# Patient Record
Sex: MALE | Race: BLACK OR AFRICAN AMERICAN | NOT HISPANIC OR LATINO | Employment: UNEMPLOYED | URBAN - METROPOLITAN AREA
[De-identification: names, ages, dates, MRNs, and addresses within clinical notes are randomized per-mention and may not be internally consistent; named-entity substitution may affect disease eponyms.]

---

## 2019-10-22 ENCOUNTER — HOSPITAL ENCOUNTER (EMERGENCY)
Facility: HOSPITAL | Age: 24
Discharge: HOME | End: 2019-10-22
Attending: EMERGENCY MEDICINE | Admitting: EMERGENCY MEDICINE
Payer: COMMERCIAL

## 2019-10-22 ENCOUNTER — APPOINTMENT (EMERGENCY)
Dept: CT IMAGING | Facility: HOSPITAL | Age: 24
End: 2019-10-22
Payer: COMMERCIAL

## 2019-10-22 VITALS
HEART RATE: 62 BPM | DIASTOLIC BLOOD PRESSURE: 63 MMHG | BODY MASS INDEX: 20.81 KG/M2 | TEMPERATURE: 98.7 F | SYSTOLIC BLOOD PRESSURE: 101 MMHG | HEIGHT: 72 IN | OXYGEN SATURATION: 100 % | RESPIRATION RATE: 21 BRPM | WEIGHT: 153.66 LBS

## 2019-10-22 DIAGNOSIS — R20.2 PARESTHESIAS: Primary | ICD-10-CM

## 2019-10-22 LAB
ALBUMIN SERPL BCP-MCNC: 4 G/DL (ref 3.5–5)
ALP SERPL-CCNC: 124 U/L (ref 46–116)
ALT SERPL W P-5'-P-CCNC: 18 U/L (ref 12–78)
ANION GAP SERPL CALCULATED.3IONS-SCNC: 12 MMOL/L (ref 4–13)
AST SERPL W P-5'-P-CCNC: 20 U/L (ref 5–45)
BASOPHILS # BLD AUTO: 0.06 THOUSANDS/ΜL (ref 0–0.1)
BASOPHILS NFR BLD AUTO: 2 % (ref 0–1)
BILIRUB SERPL-MCNC: 0.2 MG/DL (ref 0.2–1)
BUN SERPL-MCNC: 13 MG/DL (ref 5–25)
CALCIUM SERPL-MCNC: 8.7 MG/DL (ref 8.3–10.1)
CHLORIDE SERPL-SCNC: 104 MMOL/L (ref 100–108)
CO2 SERPL-SCNC: 28 MMOL/L (ref 21–32)
CREAT SERPL-MCNC: 0.87 MG/DL (ref 0.6–1.3)
EOSINOPHIL # BLD AUTO: 0.36 THOUSAND/ΜL (ref 0–0.61)
EOSINOPHIL NFR BLD AUTO: 11 % (ref 0–6)
ERYTHROCYTE [DISTWIDTH] IN BLOOD BY AUTOMATED COUNT: 12.6 % (ref 11.6–15.1)
GFR SERPL CREATININE-BSD FRML MDRD: 140 ML/MIN/1.73SQ M
GLUCOSE SERPL-MCNC: 84 MG/DL (ref 65–140)
HCT VFR BLD AUTO: 40.9 % (ref 36.5–49.3)
HGB BLD-MCNC: 13.4 G/DL (ref 12–17)
IMM GRANULOCYTES # BLD AUTO: 0.02 THOUSAND/UL (ref 0–0.2)
IMM GRANULOCYTES NFR BLD AUTO: 1 % (ref 0–2)
LYMPHOCYTES # BLD AUTO: 1.39 THOUSANDS/ΜL (ref 0.6–4.47)
LYMPHOCYTES NFR BLD AUTO: 40 % (ref 14–44)
MAGNESIUM SERPL-MCNC: 1.8 MG/DL (ref 1.6–2.6)
MCH RBC QN AUTO: 30.7 PG (ref 26.8–34.3)
MCHC RBC AUTO-ENTMCNC: 32.8 G/DL (ref 31.4–37.4)
MCV RBC AUTO: 94 FL (ref 82–98)
MONOCYTES # BLD AUTO: 0.39 THOUSAND/ΜL (ref 0.17–1.22)
MONOCYTES NFR BLD AUTO: 12 % (ref 4–12)
NEUTROPHILS # BLD AUTO: 1.13 THOUSANDS/ΜL (ref 1.85–7.62)
NEUTS SEG NFR BLD AUTO: 34 % (ref 43–75)
NRBC BLD AUTO-RTO: 0 /100 WBCS
PHOSPHATE SERPL-MCNC: 3 MG/DL (ref 2.7–4.5)
PLATELET # BLD AUTO: 201 THOUSANDS/UL (ref 149–390)
PMV BLD AUTO: 9.7 FL (ref 8.9–12.7)
POTASSIUM SERPL-SCNC: 4.1 MMOL/L (ref 3.5–5.3)
PROT SERPL-MCNC: 7.4 G/DL (ref 6.4–8.2)
RBC # BLD AUTO: 4.36 MILLION/UL (ref 3.88–5.62)
SODIUM SERPL-SCNC: 144 MMOL/L (ref 136–145)
WBC # BLD AUTO: 3.35 THOUSAND/UL (ref 4.31–10.16)

## 2019-10-22 PROCEDURE — 36415 COLL VENOUS BLD VENIPUNCTURE: CPT | Performed by: EMERGENCY MEDICINE

## 2019-10-22 PROCEDURE — 85025 COMPLETE CBC W/AUTO DIFF WBC: CPT | Performed by: EMERGENCY MEDICINE

## 2019-10-22 PROCEDURE — 99284 EMERGENCY DEPT VISIT MOD MDM: CPT | Performed by: EMERGENCY MEDICINE

## 2019-10-22 PROCEDURE — 70496 CT ANGIOGRAPHY HEAD: CPT

## 2019-10-22 PROCEDURE — 93005 ELECTROCARDIOGRAM TRACING: CPT

## 2019-10-22 PROCEDURE — 84100 ASSAY OF PHOSPHORUS: CPT | Performed by: EMERGENCY MEDICINE

## 2019-10-22 PROCEDURE — 70498 CT ANGIOGRAPHY NECK: CPT

## 2019-10-22 PROCEDURE — 99284 EMERGENCY DEPT VISIT MOD MDM: CPT

## 2019-10-22 PROCEDURE — 83735 ASSAY OF MAGNESIUM: CPT | Performed by: EMERGENCY MEDICINE

## 2019-10-22 PROCEDURE — 80053 COMPREHEN METABOLIC PANEL: CPT | Performed by: EMERGENCY MEDICINE

## 2019-10-22 RX ADMIN — IOHEXOL 85 ML: 350 INJECTION, SOLUTION INTRAVENOUS at 16:46

## 2019-10-22 NOTE — ED PROVIDER NOTES
History  Chief Complaint   Patient presents with    Numbness     Pt c/o left arm numbness x 2 days  Pt states it has worsened today and is begining to radiate to his left leg and left side of face  Pt AOx4 at this time  70-year-old male presenting to the emergency department for evaluation of left arm numbness  Patient states that started about 2 days ago after smoking marijuana, felt anxious, temp as breathing and had some resolution of his symptoms with still persistent left arm numbness  States that since then it has spread to include the left side of his face as well as his left lower extremity  There is no weakness  There is no bowels gait or coordination deficit  There is no decrease in strength  He denies any headaches or neck pain  This been no nausea or vomiting  Patient is has skin tear in and states that he sometimes has difficulty having a complete diet  None       Past Medical History:   Diagnosis Date    Aneurysm ([facility] [address] ) 2010    R sided       History reviewed  No pertinent surgical history  History reviewed  No pertinent family history  I have reviewed and agree with the history as documented  Social History     Tobacco Use    Smoking status: Current Some Day Smoker     Types: Cigarettes    Smokeless tobacco: Never Used   Substance Use Topics    Alcohol use: Yes     Comment: Socially    Drug use: Not Currently        Review of Systems   Constitutional: Negative for appetite change, chills, fatigue and fever  HENT: Negative for sneezing and sore throat  Eyes: Negative for visual disturbance  Respiratory: Negative for cough, choking, chest tightness, shortness of breath and wheezing  Cardiovascular: Negative for chest pain and palpitations  Gastrointestinal: Negative for abdominal pain, constipation, diarrhea, nausea and vomiting  Genitourinary: Negative for difficulty urinating and dysuria  Neurological: Positive for numbness   Negative for dizziness, weakness, light-headedness and headaches  All other systems reviewed and are negative  Physical Exam  Physical Exam   Constitutional: He is oriented to person, place, and time  He appears well-developed and well-nourished  No distress  HENT:   Head: Normocephalic and atraumatic  Mouth/Throat: Oropharynx is clear and moist    Eyes: Pupils are equal, round, and reactive to light  EOM are normal    Neck: No JVD present  No tracheal deviation present  Cardiovascular: Normal rate, regular rhythm, normal heart sounds and intact distal pulses  Exam reveals no gallop and no friction rub  No murmur heard  Pulmonary/Chest: Effort normal and breath sounds normal  No respiratory distress  He has no wheezes  He has no rales  Abdominal: Soft  Bowel sounds are normal  He exhibits no distension  There is no tenderness  There is no rebound and no guarding  Neurological: He is alert and oriented to person, place, and time  No cranial nerve deficit  He exhibits normal muscle tone  Cranial nerves 2-12 are intact  Strength is 5/5 in all extremities  There is some subjective paresthesias in the left sided face upper and lower extremities  The sensation is otherwise grossly intact  Finger-to-nose is normal, heel-to-shin is normal    Skin: Skin is warm and dry  He is not diaphoretic  No pallor  Psychiatric: He has a normal mood and affect  His behavior is normal    Nursing note and vitals reviewed        Vital Signs  ED Triage Vitals [10/22/19 1453]   Temperature Pulse Respirations Blood Pressure SpO2   98 7 °F (37 1 °C) 84 16 105/63 100 %      Temp Source Heart Rate Source Patient Position - Orthostatic VS BP Location FiO2 (%)   Oral Monitor Sitting Left arm --      Pain Score       No Pain           Vitals:    10/22/19 1453 10/22/19 1615 10/22/19 1730   BP: 105/63 97/62 101/63   Pulse: 84 63 62   Patient Position - Orthostatic VS: Sitting Lying          Visual Acuity  Visual Acuity      Most Recent Value   L Pupil Size (mm)  3   R Pupil Size (mm)  3          ED Medications  Medications   iohexol (OMNIPAQUE) 350 MG/ML injection (MULTI-DOSE) 85 mL (85 mL Intravenous Given 10/22/19 1646)       Diagnostic Studies  Results Reviewed     Procedure Component Value Units Date/Time    Comprehensive metabolic panel [[id number]]  (Abnormal) Collected:  10/22/19 1606    Lab Status:  Final result Specimen:  Blood from Arm, Right Updated:  10/22/19 1627     Sodium 144 mmol/L      Potassium 4 1 mmol/L      Chloride 104 mmol/L      CO2 28 mmol/L      ANION GAP 12 mmol/L      BUN 13 mg/dL      Creatinine 0 87 mg/dL      Glucose 84 mg/dL      Calcium 8 7 mg/dL      AST 20 U/L      ALT 18 U/L      Alkaline Phosphatase 124 U/L      Total Protein 7 4 g/dL      Albumin 4 0 g/dL      Total Bilirubin 0 20 mg/dL      eGFR 140 ml/min/1 73sq m     Narrative:       [place] guidelines for Chronic Kidney Disease (CKD):     Stage 1 with normal or high GFR (GFR > 90 mL/min/1 73 square meters)    Stage 2 Mild CKD (GFR = 60-89 mL/min/1 73 square meters)    Stage 3A Moderate CKD (GFR = 45-59 mL/min/1 73 square meters)    Stage 3B Moderate CKD (GFR = 30-44 mL/min/1 73 square meters)    Stage 4 Severe CKD (GFR = 15-29 mL/min/1 73 square meters)    Stage 5 End Stage CKD (GFR <15 mL/min/1 73 square meters)  Note: GFR calculation is accurate only with a steady state creatinine    Magnesium [[id number]]  (Normal) Collected:  10/22/19 1606    Lab Status:  Final result Specimen:  Blood from Arm, Right Updated:  10/22/19 1627     Magnesium 1 8 mg/dL     Phosphorus [[id number]]  (Normal) Collected:  10/22/19 1606    Lab Status:  Final result Specimen:  Blood from Arm, Right Updated:  10/22/19 1627     Phosphorus 3 0 mg/dL     CBC and differential [[id number]]  (Abnormal) Collected:  10/22/19 1606    Lab Status:  Final result Specimen:  Blood from Arm, Right Updated:  10/22/19 1612     WBC 3 35 Thousand/uL      RBC 4 36 Million/uL Hemoglobin 13 4 g/dL      Hematocrit 40 9 %      MCV 94 fL      MCH 30 7 pg      MCHC 32 8 g/dL      RDW 12 6 %      MPV 9 7 fL      Platelets 952 Thousands/uL      nRBC 0 /100 WBCs      Neutrophils Relative 34 %      Immat GRANS % 1 %      Lymphocytes Relative 40 %      Monocytes Relative 12 %      Eosinophils Relative 11 %      Basophils Relative 2 %      Neutrophils Absolute 1 13 Thousands/µL      Immature Grans Absolute 0 02 Thousand/uL      Lymphocytes Absolute 1 39 Thousands/µL      Monocytes Absolute 0 39 Thousand/µL      Eosinophils Absolute 0 36 Thousand/µL      Basophils Absolute 0 06 Thousands/µL                  CTA head and neck with and without contrast   Final Result by Dawn Worley MD (10/22 1733)         1  No evidence of acute vascular territorial infarction, intracranial hemorrhage or mass effect  2   No stenosis, dissection or occlusion of the carotid or vertebral arteries or major vessels of the [place]  Workstation performed: [id number]                    Procedures  Procedures       ED Course                               MDM  Number of Diagnoses or Management Options  Diagnosis management comments: 27-year-old male with palpitations, paresthesias, check a UA EKG, labs, if workup unremarkable refer to neurology  Disposition  Final diagnoses:   Paresthesias     Time reflects when diagnosis was documented in both MDM as applicable and the Disposition within this note     Time User Action Codes Description Comment    10/22/2019  5:48 PM Maribel, [facility] [R20 2] Paresthesias       ED Disposition     ED Disposition Condition Date/Time Comment    Discharge Stable Tue Oct 22, 2019  5:48 PM Manny León discharge to home/self care              Follow-up Information     Follow up With Specialties Details Why Contact Info Additional [facility]   [address] [facility] [id number]  [address] [facility] [address] [facility], [address], [address]          Patient's Medications    No medications on file     No discharge procedures on file      ED Provider  Electronically Signed by           Reena Noland MD  10/22/19 7097

## 2019-10-23 LAB
ATRIAL RATE: 59 BPM
P AXIS: 83 DEGREES
PR INTERVAL: 142 MS
QRS AXIS: 85 DEGREES
QRSD INTERVAL: 88 MS
QT INTERVAL: 370 MS
QTC INTERVAL: 366 MS
T WAVE AXIS: 29 DEGREES
VENTRICULAR RATE: 59 BPM

## 2019-10-23 PROCEDURE — 93010 ELECTROCARDIOGRAM REPORT: CPT | Performed by: INTERNAL MEDICINE

## 2019-11-04 ENCOUNTER — OFFICE VISIT (OUTPATIENT)
Dept: FAMILY MEDICINE CLINIC | Facility: CLINIC | Age: 24
End: 2019-11-04
Payer: COMMERCIAL

## 2019-11-04 VITALS
SYSTOLIC BLOOD PRESSURE: 104 MMHG | WEIGHT: 150 LBS | HEIGHT: 73 IN | TEMPERATURE: 98.3 F | OXYGEN SATURATION: 98 % | HEART RATE: 74 BPM | BODY MASS INDEX: 19.88 KG/M2 | DIASTOLIC BLOOD PRESSURE: 62 MMHG

## 2019-11-04 DIAGNOSIS — R20.2 PARESTHESIAS: Primary | ICD-10-CM

## 2019-11-04 DIAGNOSIS — R07.89 CHEST PRESSURE: ICD-10-CM

## 2019-11-04 DIAGNOSIS — Z71.3 RESTRICTIONS OF THE DIET: ICD-10-CM

## 2019-11-04 PROCEDURE — 99243 OFF/OP CNSLTJ NEW/EST LOW 30: CPT | Performed by: NURSE PRACTITIONER

## 2019-11-04 NOTE — PROGRESS NOTES
Assessment/Plan:    Paresthesias  Provided referral to the neurologist     Chest pressure  To obtain labs, Holter monitor and echocardiogram   Provided referral to a cardiologist as requested  Restrictions of the diet  Patient primarily eats a vegetarian diet will occasionally eat fish  To obtain B12, will call with results  Will have patient sign medical release form to obtain prior records  Diagnoses and all orders for this visit:    Paresthesias  -     Ambulatory referral to Neurology; Future    Chest pressure  -     CBC and differential; Future  -     Comprehensive metabolic panel; Future  -     TSH, 3rd generation with Free T4 reflex; Future  -     Echo follow up/limited; Future  -     Holter monitor - 24 hour; Future  -     Ambulatory referral to Cardiology; Future    Restrictions of the diet  -     Vitamin B12; Future          Subjective:      Patient ID: Laura Christina is a 25 y o  male  Irena Calderon presents with his mother to establish care  He recently relocated here from [facility]  Irena Calderon was recently in [facility] on 10/22/2019 after developing paresthesias in his left upper extremity and left lower extremity after smoking marijuana  Lab work and a CTA of his head and neck were obtained  CTA was normal  He does have a history of an aneurysm that was noted on the right side of his neck at 13years of age  He had repeat testing a week later per mother in it had spontaneously disappeared  No interventions were needed  No medical records available to review  Denies past surgical history  Since this time, the numbness and tingling in his left upper an lower extremity has improved but is still present  He has also noted some weakness associated with this  Irena Calderon also notes some left anterior chest pressure  Denies personal history of cardiac issues, shortness of breath, dizziness, or palpitations  His mother is requesting a workup and referral to Cardiology    His mother was diagnosed with AFib at a young age  Declined influenza immunization  Markus primarily eats a vegetarian diet  He will eat fish on occasion  The following portions of the patient's history were reviewed and updated as appropriate:   He  has a past medical history of Aneurysm ([address] ) (2010)  He   Patient Active Problem List    Diagnosis Date Noted    Paresthesias 11/04/2019    Chest pressure 11/04/2019    Restrictions of the diet 11/04/2019     He  has no past surgical history on file  His family history includes Atrial fibrillation in his mother  He  reports that he has been smoking cigarettes  He has never used smokeless tobacco  He reports that he drinks alcohol  He reports that he has current or past drug history  Drug: Marijuana  No current outpatient medications on file  No current facility-administered medications for this visit  He has No Known Allergies       Review of Systems   Constitutional: Negative  HENT: Negative  Eyes: Positive for visual disturbance (on left side, this has since resolved)  Respiratory: Negative  Cardiovascular: Positive for chest pain  Negative for palpitations  Gastrointestinal: Negative  Endocrine: Negative  Genitourinary: Negative  Musculoskeletal: Negative  Skin: Negative  Allergic/Immunologic: Negative  Neurological: Positive for weakness (in LUE and LLE) and numbness  Hematological: Negative  Psychiatric/Behavioral: Negative  /62   Pulse 74   Temp 98 3 °F (36 8 °C)   Ht 6' 0 5" (1 842 m)   Wt 68 kg (150 lb)   SpO2 98%   BMI 20 06 kg/m²     Objective:     Physical Exam   Constitutional: He is oriented to person, place, and time  He appears well-developed and well-nourished  HENT:   Head: Normocephalic and atraumatic  Eyes: Pupils are equal, round, and reactive to light  Conjunctivae and EOM are normal    Neck: Normal range of motion  Neck supple  No thyromegaly present     Cardiovascular: Normal rate, regular rhythm and normal heart sounds  No murmur heard  Pulmonary/Chest: Effort normal and breath sounds normal  No respiratory distress  He has no wheezes  He has no rales  He exhibits no tenderness  Musculoskeletal: Normal range of motion  Lymphadenopathy:     He has no cervical adenopathy  Neurological: He is alert and oriented to person, place, and time  He has normal strength  No cranial nerve deficit or sensory deficit  He exhibits normal muscle tone  Coordination and gait normal    Skin: Skin is warm and dry  Capillary refill takes less than 2 seconds  Psychiatric: He has a normal mood and affect  His behavior is normal  Judgment and thought content normal    Nursing note and vitals reviewed

## 2019-11-04 NOTE — ASSESSMENT & PLAN NOTE
Patient primarily eats a vegetarian diet will occasionally eat fish  To obtain B12, will call with results

## 2019-11-04 NOTE — ASSESSMENT & PLAN NOTE
To obtain labs, Holter monitor and echocardiogram   Provided referral to a cardiologist as requested

## 2019-11-13 ENCOUNTER — APPOINTMENT (OUTPATIENT)
Dept: LAB | Facility: CLINIC | Age: 24
End: 2019-11-13
Payer: COMMERCIAL

## 2019-11-13 ENCOUNTER — HOSPITAL ENCOUNTER (OUTPATIENT)
Dept: NON INVASIVE DIAGNOSTICS | Facility: CLINIC | Age: 24
Discharge: HOME | End: 2019-11-13
Payer: COMMERCIAL

## 2019-11-13 DIAGNOSIS — R07.89 CHEST PRESSURE: ICD-10-CM

## 2019-11-13 DIAGNOSIS — Z71.3 RESTRICTIONS OF THE DIET: ICD-10-CM

## 2019-11-13 LAB
ALBUMIN SERPL BCP-MCNC: 4.5 G/DL (ref 3.5–5)
ALP SERPL-CCNC: 149 U/L (ref 46–116)
ALT SERPL W P-5'-P-CCNC: 29 U/L (ref 12–78)
ANION GAP SERPL CALCULATED.3IONS-SCNC: 7 MMOL/L (ref 4–13)
AST SERPL W P-5'-P-CCNC: 23 U/L (ref 5–45)
BASOPHILS # BLD AUTO: 0.07 THOUSANDS/ΜL (ref 0–0.1)
BASOPHILS NFR BLD AUTO: 2 % (ref 0–1)
BILIRUB SERPL-MCNC: 0.29 MG/DL (ref 0.2–1)
BUN SERPL-MCNC: 11 MG/DL (ref 5–25)
CALCIUM SERPL-MCNC: 9.2 MG/DL (ref 8.3–10.1)
CHLORIDE SERPL-SCNC: 107 MMOL/L (ref 100–108)
CO2 SERPL-SCNC: 28 MMOL/L (ref 21–32)
CREAT SERPL-MCNC: 0.81 MG/DL (ref 0.6–1.3)
EOSINOPHIL # BLD AUTO: 0.55 THOUSAND/ΜL (ref 0–0.61)
EOSINOPHIL NFR BLD AUTO: 13 % (ref 0–6)
ERYTHROCYTE [DISTWIDTH] IN BLOOD BY AUTOMATED COUNT: 13.2 % (ref 11.6–15.1)
GFR SERPL CREATININE-BSD FRML MDRD: 144 ML/MIN/1.73SQ M
GLUCOSE SERPL-MCNC: 63 MG/DL (ref 65–140)
HCT VFR BLD AUTO: 43.5 % (ref 36.5–49.3)
HGB BLD-MCNC: 13.5 G/DL (ref 12–17)
IMM GRANULOCYTES # BLD AUTO: 0.02 THOUSAND/UL (ref 0–0.2)
IMM GRANULOCYTES NFR BLD AUTO: 1 % (ref 0–2)
LYMPHOCYTES # BLD AUTO: 1.7 THOUSANDS/ΜL (ref 0.6–4.47)
LYMPHOCYTES NFR BLD AUTO: 40 % (ref 14–44)
MCH RBC QN AUTO: 30 PG (ref 26.8–34.3)
MCHC RBC AUTO-ENTMCNC: 31 G/DL (ref 31.4–37.4)
MCV RBC AUTO: 97 FL (ref 82–98)
MONOCYTES # BLD AUTO: 0.36 THOUSAND/ΜL (ref 0.17–1.22)
MONOCYTES NFR BLD AUTO: 9 % (ref 4–12)
NEUTROPHILS # BLD AUTO: 1.43 THOUSANDS/ΜL (ref 1.85–7.62)
NEUTS SEG NFR BLD AUTO: 35 % (ref 43–75)
NRBC BLD AUTO-RTO: 0 /100 WBCS
PLATELET # BLD AUTO: 217 THOUSANDS/UL (ref 149–390)
PMV BLD AUTO: 10.2 FL (ref 8.9–12.7)
POTASSIUM SERPL-SCNC: 4.3 MMOL/L (ref 3.5–5.3)
PROT SERPL-MCNC: 7.8 G/DL (ref 6.4–8.2)
RBC # BLD AUTO: 4.5 MILLION/UL (ref 3.88–5.62)
SODIUM SERPL-SCNC: 142 MMOL/L (ref 136–145)
TSH SERPL DL<=0.05 MIU/L-ACNC: 0.79 UIU/ML (ref 0.36–3.74)
VIT B12 SERPL-MCNC: 434 PG/ML (ref 100–900)
WBC # BLD AUTO: 4.13 THOUSAND/UL (ref 4.31–10.16)

## 2019-11-13 PROCEDURE — 85025 COMPLETE CBC W/AUTO DIFF WBC: CPT

## 2019-11-13 PROCEDURE — 82607 VITAMIN B-12: CPT

## 2019-11-13 PROCEDURE — 93226 XTRNL ECG REC<48 HR SCAN A/R: CPT

## 2019-11-13 PROCEDURE — 93308 TTE F-UP OR LMTD: CPT

## 2019-11-13 PROCEDURE — 80053 COMPREHEN METABOLIC PANEL: CPT

## 2019-11-13 PROCEDURE — 93308 TTE F-UP OR LMTD: CPT | Performed by: INTERNAL MEDICINE

## 2019-11-13 PROCEDURE — 93321 DOPPLER ECHO F-UP/LMTD STD: CPT | Performed by: INTERNAL MEDICINE

## 2019-11-13 PROCEDURE — 84443 ASSAY THYROID STIM HORMONE: CPT

## 2019-11-13 PROCEDURE — 93325 DOPPLER ECHO COLOR FLOW MAPG: CPT | Performed by: INTERNAL MEDICINE

## 2019-11-13 PROCEDURE — 93225 XTRNL ECG REC<48 HRS REC: CPT

## 2019-11-13 PROCEDURE — 36415 COLL VENOUS BLD VENIPUNCTURE: CPT

## 2019-11-14 ENCOUNTER — TELEPHONE (OUTPATIENT)
Dept: FAMILY MEDICINE CLINIC | Facility: CLINIC | Age: 24
End: 2019-11-14

## 2019-11-14 NOTE — TELEPHONE ENCOUNTER
----- Message from [id number] sent at 11/14/2019 12:34 PM EST -----  Please call patient to make aware that his echo was normal, thank you

## 2019-11-18 DIAGNOSIS — D72.819 LEUKOPENIA, UNSPECIFIED TYPE: Primary | ICD-10-CM

## 2019-11-18 DIAGNOSIS — R74.8 ELEVATED ALKALINE PHOSPHATASE LEVEL: ICD-10-CM

## 2019-11-18 NOTE — TELEPHONE ENCOUNTER
Please refer to the echo result note and "Notes recorded by MICHAELLE Katz on 11/18/2019 at 1:01 PM EST  Please call patient to make aware that lab work was ok other than his WBCs remain on the low side and his alk phos was elevated  I would like him to repeat both in 1 month to continue to monitor  Thank you"

## 2019-11-18 NOTE — TELEPHONE ENCOUNTER
Called emergency number on file and spoke with patient's mother and asked to have patient return call to the office, I did not see an updated communication form

## 2019-12-06 PROCEDURE — 93227 XTRNL ECG REC<48 HR R&I: CPT | Performed by: INTERNAL MEDICINE

## 2019-12-12 ENCOUNTER — TELEPHONE (OUTPATIENT)
Dept: FAMILY MEDICINE CLINIC | Facility: CLINIC | Age: 24
End: 2019-12-12

## 2019-12-12 NOTE — TELEPHONE ENCOUNTER
No answer voice mail is not set up    ----- Message from Josselin Mckenna, [address] sent at 12/12/2019  7:54 AM EST -----  Please call patient to make aware that Holter monitor showed primarily sinus rhythm with minimal ectopic or extra beats

## 2020-01-06 ENCOUNTER — TELEPHONE (OUTPATIENT)
Dept: CARDIOLOGY CLINIC | Facility: CLINIC | Age: 25
End: 2020-01-06

## 2020-01-06 NOTE — TELEPHONE ENCOUNTER
We received a Records Request for documents from 11/13/19    Faxed echo & holter results to [phone]

## 2020-02-25 ENCOUNTER — CONSULT (OUTPATIENT)
Dept: NEUROLOGY | Facility: CLINIC | Age: 25
End: 2020-02-25
Payer: COMMERCIAL

## 2020-02-25 VITALS
DIASTOLIC BLOOD PRESSURE: 70 MMHG | BODY MASS INDEX: 21.67 KG/M2 | HEART RATE: 68 BPM | WEIGHT: 160 LBS | SYSTOLIC BLOOD PRESSURE: 130 MMHG | HEIGHT: 72 IN

## 2020-02-25 DIAGNOSIS — R20.2 PARESTHESIAS: ICD-10-CM

## 2020-02-25 PROCEDURE — 99244 OFF/OP CNSLTJ NEW/EST MOD 40: CPT | Performed by: PSYCHIATRY & NEUROLOGY

## 2020-02-25 NOTE — PROGRESS NOTES
Laura Christina is a 25 y o  male  Chief Complaint   Patient presents with    Neurologic Problem       Assessment:  1  Paresthesias          Discussion:  Differential diagnosis discussed with the patient, will need to rule a demyelinating disorder, would recommend an MRI scan of the brain and routine blood work to evaluate for her symptoms, patient to call me after the above test to discuss the results, he was advised to follow up with family physician regarding his low WBC count and neutrophil comment and also to follow-up with the cardiologist as advised by the family physician, to keep his blood pressure cholesterol and sugar under control, avoid marijuana and other recreational drug use, to go to the hospital if has any worsening symptoms and call me otherwise to see me back in 2 months and follow up with his other physicians  Subjective:    HPI   Patient is here for evaluation of paresthesia of his face left arm and left leg, this happened in November, he had smoked marijuana a couple of days before when he had numbness of the left arm and the face and in the leg up to the thigh, initially felt anxious and then has had the left arm numbness which included to his left side of the face as well as part of the lower extremity, his symptoms in the lower leg lasted for about a day, the arm persisted for about 2 weeks, and the face was for about a day, he did not have any headache, there was no vision or speech difficulty, he had a CTA of the head and neck that was unremarkable, since then he did not have any recurrence of the symptoms, no neck pain, no vision difficulty, no speech difficulty, no history of seizure, no history of neurological conditions in the family at a young age, he does not do any other recreational drugs, he works as the artist, appetite is good, weight has been good, no recent illness, no other complaints      Vitals:    02/25/20 1108   BP: 130/70   BP Location: Left arm   Patient Position: Sitting   Cuff Size: Adult   Pulse: 68   Weight: 72 6 kg (160 lb)   Height: 6' (1 829 m)       Current Medications  No current outpatient medications on file  Allergies  Patient has no known allergies  Past Medical History  Past Medical History:   Diagnosis Date    Aneurysm ([address] ) 2010    R sided         Past Surgical History:  History reviewed  No pertinent surgical history  Family History:  Family History   Problem Relation Age of Onset    Atrial fibrillation Mother        Social History:   reports that he has been smoking cigarettes  He has never used smokeless tobacco  He reports that he drinks alcohol  He reports that he has current or past drug history  Drug: Marijuana  I have reviewed the past medical history, surgical history, social and family history, current medications, allergies vitals, review of systems, and updated this information as appropriate today  Objective:    Physical Exam    Neurological Exam      GENERAL:  Cooperative in no acute distress  Well-developed and well-nourished    HEAD and NECK   Head is atraumatic normocephalic with no lesions or masses  Neck is supple with full range of motion    CARDIOVASCULAR  Carotid Arteries-no carotid bruits  NEUROLOGIC:  Mental Status-the patient is awake alert and oriented without aphasia or apraxia  Cranial Nerves: Visual fields are full to confrontation  Discs are flat  Extraocular movements are full without nystagmus  Pupils are 2-1/2 mm and reactive  Face is symmetrical to light touch  Movements of facial expression move symmetrically  Hearing is normal to finger rub bilaterally  Soft palate lifts symmetrically  Shoulder shrug is symmetrical  Tongue is midline without atrophy  Motor: No drift is noted on arm extension  Strength is full in the upper and lower extremities with normal bulk and tone  Sensory: Intact to temperature and vibratory sensation in the upper and lower extremities bilaterally   Cortical function is intact  Coordination: Finger to nose testing is performed accurately  Romberg is negative  Gait reveals a normal base with symmetrical arm swing  Tandem walk is normal   Reflexes:    2+ and symmetrical   Toes are downgoing  No cervical spine tenderness, no meningeal signs, no temporal artery tenderness  ROS:  Review of Systems   Constitutional: Negative  Negative for appetite change and fever  HENT: Negative  Negative for hearing loss, tinnitus, trouble swallowing and voice change  Eyes: Negative  Negative for photophobia and pain  Respiratory: Negative  Negative for shortness of breath  Cardiovascular: Negative  Negative for palpitations  Gastrointestinal: Negative  Negative for nausea and vomiting  Endocrine: Negative  Negative for cold intolerance and heat intolerance  Genitourinary: Negative  Negative for dysuria, frequency and urgency  Musculoskeletal: Negative  Negative for myalgias and neck pain  Skin: Negative  Negative for rash  Neurological: Positive for numbness  Negative for dizziness, tremors, seizures, syncope, facial asymmetry, speech difficulty, weakness, light-headedness and headaches  Patient stated that he has had numbness on left side that last for about two weeks then went away  Hematological: Negative  Does not bruise/bleed easily  Psychiatric/Behavioral: Negative  Negative for confusion, hallucinations and sleep disturbance

## 2020-03-24 ENCOUNTER — TELEPHONE (OUTPATIENT)
Dept: NEUROLOGY | Facility: CLINIC | Age: 25
End: 2020-03-24

## 2020-03-24 NOTE — TELEPHONE ENCOUNTER
Received medical records request from [facility] for patient's entire medical record  Request scanned in to chart and faxed to [facility]      Fax Request to:  [facility]   Fax# [phone]    Sent to [facility] on 03/24/2020

## 2020-04-07 ENCOUNTER — TELEPHONE (OUTPATIENT)
Dept: NEUROLOGY | Facility: CLINIC | Age: 25
End: 2020-04-07

## 2020-06-25 ENCOUNTER — TELEPHONE (OUTPATIENT)
Dept: NEUROLOGY | Facility: CLINIC | Age: 25
End: 2020-06-25

## 2020-07-06 ENCOUNTER — APPOINTMENT (OUTPATIENT)
Dept: RADIOLOGY | Facility: CLINIC | Age: 25
End: 2020-07-06
Payer: COMMERCIAL

## 2020-07-06 VITALS
TEMPERATURE: 97.6 F | DIASTOLIC BLOOD PRESSURE: 70 MMHG | WEIGHT: 167 LBS | SYSTOLIC BLOOD PRESSURE: 108 MMHG | HEART RATE: 68 BPM | BODY MASS INDEX: 22.62 KG/M2 | HEIGHT: 72 IN

## 2020-07-06 DIAGNOSIS — S99.922A INJURY OF LEFT GREAT TOE, INITIAL ENCOUNTER: ICD-10-CM

## 2020-07-06 DIAGNOSIS — S90.112A CONTUSION OF LEFT GREAT TOE WITHOUT DAMAGE TO NAIL, INITIAL ENCOUNTER: Primary | ICD-10-CM

## 2020-07-06 PROCEDURE — 1036F TOBACCO NON-USER: CPT | Performed by: FAMILY MEDICINE

## 2020-07-06 PROCEDURE — 99203 OFFICE O/P NEW LOW 30 MIN: CPT | Performed by: FAMILY MEDICINE

## 2020-07-06 PROCEDURE — 3008F BODY MASS INDEX DOCD: CPT | Performed by: FAMILY MEDICINE

## 2020-07-06 PROCEDURE — 73660 X-RAY EXAM OF TOE(S): CPT

## 2020-07-06 NOTE — PROGRESS NOTES
Assessment/Plan:  Assessment/Plan   Diagnoses and all orders for this visit:    Contusion of left great toe without damage to nail, initial encounter  -     XR toe left great min 2 views; Future        40-year-old male with left great toe pain of onset from injury at home on 06/13/2020  Discussed with patient physical exam, radiographs, impression and plan  X-rays the left great toe are unremarkable for acute osseous abnormality  Physical exam noted for mild tenderness upon palpation at the medial aspect of the IP joint of the great toe, and along the base of the proximal phalanx and 1st metatarsal   Ankle is unremarkable for bony or soft tissue abnormality and has intact range of motion and strength  He has intact flexion and extension mechanism of the great toe however weakness with flexion  Clinical impression that he may have said contusion to the toe  He is to continue with conservative management  He is advised to take 1 tab of Aleve twice daily with food consistently for 10 days, start taking tumeric 500 mg twice daily, and continue with icing as needed  He is also advised to wear stiff-soled shoes  He was advised it may take another 3-4 to before symptoms resolve  He will follow up as needed  Subjective:   Patient ID: Lake Grewal is a 22 y o  male  Chief Complaint   Patient presents with    Left Great Toe - Pain       40-year-old active male presents for evaluation of left great toe pain of onset from injury at home on 06/13/2020  He was barefoot when his great toe struck the ground with axial load  He had pain that was described as sudden onset, generalized to the left great toe, throbbing, nonradiating, associated with swelling, and worse with bearing weight  He had difficulty bearing weight due to the pain  He did resting and icing which he states helped with pain and swelling    He start to bear weight with more ease, however pain started to radiate along the medial aspect of the foot           The following portions of the patient's history were reviewed and updated as appropriate: He  has a past medical history of Aneurysm ([facility] [address] ) (2010)  He  has no past surgical history on file  His family history includes Atrial fibrillation in his mother  He  reports that he has quit smoking  His smoking use included cigarettes  He started smoking about 4 months ago  He has never used smokeless tobacco  He reports that he drinks alcohol  He reports that he has current or past drug history  Drug: Marijuana  He has No Known Allergies       Review of Systems   Constitutional: Negative for chills and fever  HENT: Negative for sore throat  Eyes: Negative for visual disturbance  Respiratory: Negative for shortness of breath  Cardiovascular: Negative for chest pain  Gastrointestinal: Negative for abdominal pain  Genitourinary: Negative for flank pain  Musculoskeletal: Positive for arthralgias and joint swelling  Skin: Negative for rash and wound  Neurological: Negative for weakness and numbness  Hematological: Does not bruise/bleed easily  Psychiatric/Behavioral: Negative for self-injury  Objective:  Vitals:    07/06/20 0907   BP: 108/70   Pulse: 68   Temp: 97 6 °F (36 4 °C)   Weight: 75 8 kg (167 lb)   Height: 6' (1 829 m)     Left Ankle Exam     Muscle Strength   Dorsiflexion:  5/5   Plantar flexion:  5/5     Other   Sensation: normal    Comments:  Normal capillary refill      Left Knee Exam     Muscle Strength   The patient has normal left knee strength  Tenderness   The patient is experiencing no tenderness  Range of Motion   Extension: normal           Observations   Left Ankle/Foot   Negative for deformity  Tenderness   Left Ankle/Foot   Tenderness in the first metatarsal head   No tenderness in the Achilles insertion, anterior ankle, anterior talofibular ligament, fifth metatarsal base, calcaneofibular ligament, deltoid ligament, dorsum foot, lateral malleolus, medial malleolus, navicular, peroneal tendon, posterior talofibular ligament, proximal Achilles and talar dome  Additional Tenderness Details  Left great toe  -IP joint  -distal and proximal phalanges    Active Range of Motion   Left Ankle/Foot   Dorsiflexion (kf): WFL  Plantar flexion: WFL  Inversion: WFL  Eversion: WFL  Great toe flexion: 10 degrees   Great toe extension: [facility]    Strength/Myotome Testing     Left Ankle/Foot   Dorsiflexion: 5  Plantar flexion: 5  Inversion: 5  Eversion: 5  Great toe flexion: 4+  Great toe extension: 4+    Tests   Left Ankle/Foot   Negative for anterior drawer, metatarsal squeeze, posterior drawer, syndesmosis squeeze and syndesmosis external rotation  Physical Exam   Constitutional: He is oriented to person, place, and time  He appears well-developed  No distress  HENT:   Head: Normocephalic and atraumatic  Eyes: Conjunctivae are normal    Neck: No tracheal deviation present  Cardiovascular: Normal rate  Pulmonary/Chest: Effort normal  No respiratory distress  Abdominal: He exhibits no distension  Musculoskeletal:        Left ankle: No lateral malleolus, no medial malleolus, no CF ligament and no posterior TFL tenderness found  Left foot: There is no deformity  Neurological: He is alert and oriented to person, place, and time  Skin: Skin is warm and dry  Psychiatric: He has a normal mood and affect  His behavior is normal    Nursing note and vitals reviewed  I have personally reviewed pertinent films in PACS and my interpretation is No osseous abnormality of left great toe

## 2020-07-20 ENCOUNTER — TELEPHONE (OUTPATIENT)
Dept: NEUROLOGY | Facility: CLINIC | Age: 25
End: 2020-07-20

## 2020-07-20 NOTE — TELEPHONE ENCOUNTER
Attempted to call Clemente Butt at [phone] and [facility] letting patient know we are calling to see if he had rescheduled his MRI Brain and if he had his blood work done, We see he has an appointment today with Dr Jackson Gomez and we will see him later today

## 2020-07-23 ENCOUNTER — APPOINTMENT (OUTPATIENT)
Dept: RADIOLOGY | Facility: MEDICAL CENTER | Age: 25
End: 2020-07-23
Payer: COMMERCIAL

## 2020-07-23 ENCOUNTER — OFFICE VISIT (OUTPATIENT)
Dept: OBGYN CLINIC | Facility: MEDICAL CENTER | Age: 25
End: 2020-07-23
Payer: COMMERCIAL

## 2020-07-23 VITALS
BODY MASS INDEX: 22.35 KG/M2 | WEIGHT: 165 LBS | SYSTOLIC BLOOD PRESSURE: 114 MMHG | HEART RATE: 84 BPM | DIASTOLIC BLOOD PRESSURE: 77 MMHG | HEIGHT: 72 IN

## 2020-07-23 DIAGNOSIS — M79.674 PAIN OF TOE OF RIGHT FOOT: Primary | ICD-10-CM

## 2020-07-23 DIAGNOSIS — M79.674 PAIN OF TOE OF RIGHT FOOT: ICD-10-CM

## 2020-07-23 PROCEDURE — 3008F BODY MASS INDEX DOCD: CPT | Performed by: PHYSICAL MEDICINE & REHABILITATION

## 2020-07-23 PROCEDURE — 1036F TOBACCO NON-USER: CPT | Performed by: PHYSICAL MEDICINE & REHABILITATION

## 2020-07-23 PROCEDURE — 73630 X-RAY EXAM OF FOOT: CPT

## 2020-07-23 PROCEDURE — 99213 OFFICE O/P EST LOW 20 MIN: CPT | Performed by: PHYSICAL MEDICINE & REHABILITATION

## 2020-07-23 NOTE — PROGRESS NOTES
1  Pain of toe of right foot  XR foot 3+ vw right    diclofenac sodium (VOLTAREN) 1 %     Orders Placed This Encounter   Procedures    XR foot 3+ vw right        Imaging Studies (I personally reviewed images in PACS and report):  Right foot x-rays most recent to this encounter reviewed  These images show no acute osseous abnormalities  Impression:  Right 2nd toe pain likely secondary to bone contusion  We discussed different treatment options and decided to proceed with Voltaren gel and kelly taping  He will also continue to wear his stiff-soled shoes that he already has  I will see him back in about 3-4 weeks if he continues to have pain  Can consider repeat x-rays if that is still the case  Return in about 3 weeks (around 8/13/2020)  HPI:  Laverne Boeck is a 22 y o  male  who presents for evaluation of   Chief Complaint   Patient presents with    Right 2nd Toe - Pain       Onset/Mechanism: 7/17/2020- he tripped down the stairs with his toes getting caught in the steps  Location: Base of 2nd toe  Radiation: Denies  Quality: Aching and bruising  Provocative: Bending the toe  Severity: Hurts  Associated Symptoms: Bruising and tingling  Treatment so far: Kelly taped the toes together  He feels like it is improving  Review of Systems   Constitutional: Positive for activity change  Negative for fever  HENT: Negative for sore throat  Eyes: Negative for visual disturbance  Respiratory: Negative for shortness of breath  Cardiovascular: Negative for chest pain  Gastrointestinal: Negative for abdominal pain  Endocrine: Negative for polydipsia  Genitourinary: Negative for difficulty urinating  Musculoskeletal: Positive for arthralgias, gait problem and joint swelling  Skin: Negative for rash  Allergic/Immunologic: Negative for immunocompromised state  Neurological: Negative for numbness  Hematological: Does not bruise/bleed easily     Psychiatric/Behavioral: Negative for confusion  Following history reviewed and updated:  Past Medical History:   Diagnosis Date    Aneurysm ([address] ) 2010    R sided     History reviewed  No pertinent surgical history  Social History   Social History     Substance and Sexual Activity   Alcohol Use Yes    Comment: Socially     Social History     Substance and Sexual Activity   Drug Use Yes    Types: Marijuana     Social History     Tobacco Use   Smoking Status Former Smoker    Types: Cigarettes    Start date: 3/5/2020   Smokeless Tobacco Never Used     Family History   Problem Relation Age of Onset    Atrial fibrillation Mother      No Known Allergies     Constitutional:  /77   Pulse 84   Ht 6' (1 829 m)   Wt 74 8 kg (165 lb)   BMI 22 38 kg/m²    General: NAD  Eyes: Anicteric sclerae  Neck: Supple  Lungs: Unlabored breathing  Cardiovascular: No lower extremity edema  Skin: Intact without erythema  Neurologic: Sensation intact to light touch  Psychiatric: Mood and affect are appropriate  Right Ankle Exam     Tenderness   Right ankle tenderness location: Tenderness to palpation along the right 2nd toe at distal interphalangeal joint  Swelling: mild    Range of Motion   The patient has normal right ankle ROM  Muscle Strength   The patient has normal right ankle strength  Other   Erythema: absent  Scars: absent  Sensation: normal  Pulse: present              Procedures - none for this visit  Portions of the record may have been created with voice recognition software  Occasional wrong word or "sound a like" substitutions may have occurred due to the inherent limitations of voice recognition software  Read the chart carefully and recognize, using context, where substitutions have occurred

## 2020-08-04 ENCOUNTER — HOSPITAL ENCOUNTER (OUTPATIENT)
Dept: MRI IMAGING | Facility: CLINIC | Age: 25
Discharge: HOME | End: 2020-08-04
Attending: PSYCHIATRY & NEUROLOGY
Payer: COMMERCIAL

## 2020-08-04 DIAGNOSIS — R20.2 PARESTHESIAS: ICD-10-CM

## 2020-08-04 PROCEDURE — 70551 MRI BRAIN STEM W/O DYE: CPT

## 2020-08-05 DIAGNOSIS — I67.1 INTRACRANIAL ANEURYSM: Primary | ICD-10-CM

## 2020-08-07 ENCOUNTER — TELEPHONE (OUTPATIENT)
Dept: NEUROSURGERY | Facility: CLINIC | Age: 25
End: 2020-08-07

## 2020-08-10 ENCOUNTER — TELEPHONE (OUTPATIENT)
Dept: NEUROLOGY | Facility: CLINIC | Age: 25
End: 2020-08-10

## 2020-08-10 ENCOUNTER — CONSULT (OUTPATIENT)
Dept: NEUROSURGERY | Facility: CLINIC | Age: 25
End: 2020-08-10
Payer: COMMERCIAL

## 2020-08-10 VITALS
WEIGHT: 163 LBS | TEMPERATURE: 98.3 F | RESPIRATION RATE: 16 BRPM | BODY MASS INDEX: 22.08 KG/M2 | HEART RATE: 85 BPM | DIASTOLIC BLOOD PRESSURE: 70 MMHG | HEIGHT: 72 IN | SYSTOLIC BLOOD PRESSURE: 110 MMHG

## 2020-08-10 DIAGNOSIS — I67.1 INTRACRANIAL ANEURYSM: Primary | ICD-10-CM

## 2020-08-10 PROCEDURE — 99244 OFF/OP CNSLTJ NEW/EST MOD 40: CPT | Performed by: NEUROLOGICAL SURGERY

## 2020-08-10 PROCEDURE — 3008F BODY MASS INDEX DOCD: CPT | Performed by: NEUROLOGICAL SURGERY

## 2020-08-10 NOTE — PROGRESS NOTES
Patient Id: Gerardo Bhatia is a 22 y o  male        Handedness: Right      Assessment/Plan:    Diagnoses and all orders for this visit:    Intracranial aneurysm  -     Ambulatory referral to Neurosurgery  -     IR cerebral angiography; Future        Discussion Summary:   1  Fusiform aneurysmal dilatation of right M2 branch  We discussed the natural history and diagnosis of aneurysms, specifically regarding fusiform aneurysms  His MRI did not had any evidence of ischemic changes however his prior symptomatology could be associated with small  injury  I would like to perform diagnostic cerebral arteriogram to better evaluate relationship to lateral lenticulostriate and this vessel irregularity  In addition this will provide baseline for continued follow-up given his young age  We discussed the risks and benefits associated for the formal arteriogram including bleeding, vessel injury, and stroke  He will schedule this at his earliest convenience  Chief Complaint: Consult      HPI:   This is a very pleasant 66-year-old gentleman who in October had an episode of left-sided arm and leg numbness after smoking marijuana  This did not resolve for several days with the left leg and about 2 weeks in the left leg  As such he presented to Neurology  MRI of his brain and CTA were performed  MRI was negative for ischemic events or sequelae however both the MRI and CTA were notable for a enlarged and dilated right M2 branch concerning for fusiform aneurysm  As such he was referred for evaluation  At this time he denies any new numbness tingling or weakness  He denies any new focal neurologic deficit  He states that every once in a while he gets a headache or feels lightheaded these episodes are short lived  His past medical history is insignificant  He has never had surgery before  He is not allergic to any medications  He takes no medications  He is single  He is employed as a musician  He has no children  He uses marijuana intermittently  He quit smoking in 2020  He previously smoked about 1 pack a week  He denies any use of illicit drugs, specifically heroin, cocaine, methamphetamine  He has no family history of aneurysms that he is aware of he states that his grandmother and aunt  of strokes  Review of systems obtained by the MA reviewed and updated below  Review of Systems   Constitutional: Negative  HENT: Negative  Eyes: Negative  Respiratory: Negative  Cardiovascular: Negative  Gastrointestinal: Negative  Endocrine: Negative  Genitourinary: Negative  Musculoskeletal: Negative  Skin: Negative  Allergic/Immunologic: Negative  Neurological: Positive for light-headedness (when he works out feels like he gets light-headed quicker)  Negative for dizziness, tremors, seizures, speech difficulty, weakness, numbness and headaches  Hematological: Negative  Psychiatric/Behavioral: Negative  Physical Exam  Vitals:    08/10/20 1457   BP: 110/70   Pulse: 85   Resp: 16   Temp: 98 3 °F (36 8 °C)   He is well appearing  Affect is appropriate  His BMI is Body mass index is 22 11 kg/m²  Claudette Mcadniel He is awake alert and oriented  Hearing and vision are grossly intact  His pupils are equal round reactive to light  His extraocular movements are intact  His face is symmetric  Tongue is midline  Facial sensation is intact and symmetric throughout  Shoulder shrug is 5/5  There is no drift or dysmetria  He has full strength in his bilateral upper and lower extremities  He has normal muscle tone muscle bulk  His biceps reflexes and patellar reflexes are 2+ and symmetric  Ofelia sign negative bilaterally  Sensation intact to light touch and pinprick throughout  His gait is normal     His heart rate is regular  His breath sounds are clear  2+ radial pulses no carotid bruit       The following portions of the patient's history were reviewed and updated as appropriate: allergies, current medications, past family history, past medical history, past social history, past surgical history and problem list     Active Ambulatory Problems     Diagnosis Date Noted    Paresthesias 11/04/2019    Chest pressure 11/04/2019    Restrictions of the diet 11/04/2019    Pain of toe of right foot 07/23/2020    Intracranial aneurysm 08/10/2020     Resolved Ambulatory Problems     Diagnosis Date Noted    No Resolved Ambulatory Problems     Past Medical History:   Diagnosis Date    Aneurysm ([address] ) 2010       No past surgical history on file  Current Outpatient Medications:     diclofenac sodium (VOLTAREN) 1 %, Apply 2 g topically 3 (three) times a day as needed (Pain), Disp: 100 g, Rfl: 1    Results/Data: We reviewed his MRI and CT in detail  We also reviewed the report

## 2020-08-12 ENCOUNTER — TELEPHONE (OUTPATIENT)
Dept: OTHER | Facility: OTHER | Age: 25
End: 2020-08-12

## 2020-08-13 ENCOUNTER — TELEPHONE (OUTPATIENT)
Dept: OTHER | Facility: HOSPITAL | Age: 25
End: 2020-08-13

## 2020-08-13 NOTE — TELEPHONE ENCOUNTER
Patient phoned at approximately 23:40 complaining of left side headache and facial numbness  He was seen in consultation on 8/10/2020 for right M2 branch fusiform aneurysm  He is scheduled for a diagnostic cerebral arteriogram on 8/26/2020  He describes the headache as a "migraine" similar to his usual migraines  He describes that his headaches are usually on the right side but this time it was on the left and associated with facial numbness  He has been icing the back of his head and neck and states his headache has now resolved along with the facial numbness  He denies any nausea or vomiting  He denies any neck pain  He states the headache was mild in onset  We discussed signs and symptoms of aneurysmal rupture including sudden onset of high intensity headache  He denies that this is [facility]  We both agreed that his symptoms are unlikely a result of aneurysm rupture since they have mostly resolved and were not sudden or intense  We discussed taking OTC Tylenol for further pain relief  We discussed return precautions prompting presentation to an ER including those discussed above as well as vomiting or seizures  He agrees that he does not think he needs emergent care  We will phone him in the morning to further evaluate how he's feeling

## 2020-08-13 NOTE — TELEPHONE ENCOUNTER
Pt Oliver Alejandra : 1995 MSG: Pt callled because he was recently diagnosed by Dr Michael Frank as having an intracranial aneurysm  Pt has a very bad headache and feels "tingly" in the right side of his face which hasn't happened before and he's concerned  Episode started about 30 minutes ago   Pt can be contacted at [phone]

## 2020-08-29 ENCOUNTER — HOSPITAL ENCOUNTER (EMERGENCY)
Facility: HOSPITAL | Age: 25
Discharge: HOME | End: 2020-08-29
Attending: EMERGENCY MEDICINE | Admitting: EMERGENCY MEDICINE
Payer: COMMERCIAL

## 2020-08-29 ENCOUNTER — APPOINTMENT (EMERGENCY)
Dept: CT IMAGING | Facility: HOSPITAL | Age: 25
End: 2020-08-29
Payer: COMMERCIAL

## 2020-08-29 VITALS
WEIGHT: 160 LBS | HEART RATE: 68 BPM | RESPIRATION RATE: 17 BRPM | OXYGEN SATURATION: 100 % | HEIGHT: 72 IN | BODY MASS INDEX: 21.67 KG/M2 | DIASTOLIC BLOOD PRESSURE: 69 MMHG | TEMPERATURE: 98.2 F | SYSTOLIC BLOOD PRESSURE: 100 MMHG

## 2020-08-29 DIAGNOSIS — R51.9 ACUTE NONINTRACTABLE HEADACHE, UNSPECIFIED HEADACHE TYPE: Primary | ICD-10-CM

## 2020-08-29 LAB
ANION GAP SERPL CALCULATED.3IONS-SCNC: 8 MMOL/L (ref 4–13)
BASOPHILS # BLD AUTO: 0.07 THOUSANDS/ΜL (ref 0–0.1)
BASOPHILS NFR BLD AUTO: 1 % (ref 0–1)
BUN SERPL-MCNC: 9 MG/DL (ref 5–25)
CALCIUM SERPL-MCNC: 8.8 MG/DL (ref 8.3–10.1)
CHLORIDE SERPL-SCNC: 103 MMOL/L (ref 100–108)
CO2 SERPL-SCNC: 30 MMOL/L (ref 21–32)
CREAT SERPL-MCNC: 1.07 MG/DL (ref 0.6–1.3)
EOSINOPHIL # BLD AUTO: 0.09 THOUSAND/ΜL (ref 0–0.61)
EOSINOPHIL NFR BLD AUTO: 2 % (ref 0–6)
ERYTHROCYTE [DISTWIDTH] IN BLOOD BY AUTOMATED COUNT: 12.2 % (ref 11.6–15.1)
GFR SERPL CREATININE-BSD FRML MDRD: 111 ML/MIN/1.73SQ M
GLUCOSE SERPL-MCNC: 102 MG/DL (ref 65–140)
HCT VFR BLD AUTO: 40.3 % (ref 36.5–49.3)
HGB BLD-MCNC: 13.1 G/DL (ref 12–17)
IMM GRANULOCYTES # BLD AUTO: 0.03 THOUSAND/UL (ref 0–0.2)
IMM GRANULOCYTES NFR BLD AUTO: 1 % (ref 0–2)
LYMPHOCYTES # BLD AUTO: 1.74 THOUSANDS/ΜL (ref 0.6–4.47)
LYMPHOCYTES NFR BLD AUTO: 36 % (ref 14–44)
MCH RBC QN AUTO: 30.8 PG (ref 26.8–34.3)
MCHC RBC AUTO-ENTMCNC: 32.5 G/DL (ref 31.4–37.4)
MCV RBC AUTO: 95 FL (ref 82–98)
MONOCYTES # BLD AUTO: 0.51 THOUSAND/ΜL (ref 0.17–1.22)
MONOCYTES NFR BLD AUTO: 11 % (ref 4–12)
NEUTROPHILS # BLD AUTO: 2.4 THOUSANDS/ΜL (ref 1.85–7.62)
NEUTS SEG NFR BLD AUTO: 49 % (ref 43–75)
NRBC BLD AUTO-RTO: 0 /100 WBCS
PLATELET # BLD AUTO: 191 THOUSANDS/UL (ref 149–390)
PMV BLD AUTO: 10.3 FL (ref 8.9–12.7)
POTASSIUM SERPL-SCNC: 3.7 MMOL/L (ref 3.5–5.3)
RBC # BLD AUTO: 4.26 MILLION/UL (ref 3.88–5.62)
SODIUM SERPL-SCNC: 141 MMOL/L (ref 136–145)
WBC # BLD AUTO: 4.84 THOUSAND/UL (ref 4.31–10.16)

## 2020-08-29 PROCEDURE — 96375 TX/PRO/DX INJ NEW DRUG ADDON: CPT

## 2020-08-29 PROCEDURE — 99285 EMERGENCY DEPT VISIT HI MDM: CPT | Performed by: EMERGENCY MEDICINE

## 2020-08-29 PROCEDURE — NC001 PR NO CHARGE: Performed by: PHYSICIAN ASSISTANT

## 2020-08-29 PROCEDURE — 99284 EMERGENCY DEPT VISIT MOD MDM: CPT

## 2020-08-29 PROCEDURE — 85025 COMPLETE CBC W/AUTO DIFF WBC: CPT | Performed by: EMERGENCY MEDICINE

## 2020-08-29 PROCEDURE — 70496 CT ANGIOGRAPHY HEAD: CPT

## 2020-08-29 PROCEDURE — 70498 CT ANGIOGRAPHY NECK: CPT

## 2020-08-29 PROCEDURE — 80048 BASIC METABOLIC PNL TOTAL CA: CPT | Performed by: EMERGENCY MEDICINE

## 2020-08-29 PROCEDURE — 96374 THER/PROPH/DIAG INJ IV PUSH: CPT

## 2020-08-29 PROCEDURE — G1004 CDSM NDSC: HCPCS

## 2020-08-29 PROCEDURE — 36415 COLL VENOUS BLD VENIPUNCTURE: CPT | Performed by: EMERGENCY MEDICINE

## 2020-08-29 PROCEDURE — 96361 HYDRATE IV INFUSION ADD-ON: CPT

## 2020-08-29 RX ORDER — METOCLOPRAMIDE HYDROCHLORIDE 5 MG/ML
10 INJECTION INTRAMUSCULAR; INTRAVENOUS ONCE
Status: COMPLETED | OUTPATIENT
Start: 2020-08-29 | End: 2020-08-29

## 2020-08-29 RX ORDER — DIPHENHYDRAMINE HYDROCHLORIDE 50 MG/ML
25 INJECTION INTRAMUSCULAR; INTRAVENOUS ONCE
Status: COMPLETED | OUTPATIENT
Start: 2020-08-29 | End: 2020-08-29

## 2020-08-29 RX ORDER — ACETAMINOPHEN 325 MG/1
650 TABLET ORAL ONCE
Status: COMPLETED | OUTPATIENT
Start: 2020-08-29 | End: 2020-08-29

## 2020-08-29 RX ADMIN — DIPHENHYDRAMINE HYDROCHLORIDE 25 MG: 50 INJECTION, SOLUTION INTRAMUSCULAR; INTRAVENOUS at 18:55

## 2020-08-29 RX ADMIN — ACETAMINOPHEN 650 MG: 325 TABLET, FILM COATED ORAL at 18:54

## 2020-08-29 RX ADMIN — METOCLOPRAMIDE HYDROCHLORIDE 10 MG: 5 INJECTION INTRAMUSCULAR; INTRAVENOUS at 18:58

## 2020-08-29 RX ADMIN — SODIUM CHLORIDE 1000 ML: 0.9 INJECTION, SOLUTION INTRAVENOUS at 18:53

## 2020-08-29 RX ADMIN — IOHEXOL 85 ML: 350 INJECTION, SOLUTION INTRAVENOUS at 19:34

## 2020-08-29 NOTE — ED PROVIDER NOTES
History  Chief Complaint   Patient presents with    Headache     Patient co intermittent headache, stiff neck, facial numbness/tingling  Patient recently dx with intracranial anuerysm  Patient states "I just feel worried with my diagnosis and my symptoms "      Patient is a 26-year-old male past medical history of intracranial aneurysm diagnosed 08/10/2020 presenting for headaches and left facial numbness  Patient states the last 2 weeks he has had intermittent headaches which he states are left posterior, occasionally right posterior and describes a shooting pain which radiates down his neck  He also notes intermittent left-sided facial numbness and tingling and states he occasionally gets numbness tingling to the forehead, left arm, left leg  He also notes intermittently blurred vision to his right eye MO so when he is having headaches which are photophobic and photophobic in nature  He states that he was taking Tylenol p m  3 times this week as well as Aleve last night, 2 other times this week which he states does give some relief  Denies any head injuries  He states that he was told that he had an aneurysm but is unsure who is supposed to follow-up with was told that he was developing symptoms such as these he should be evaluated in the emergency department  Denies any fevers, nausea vomiting, upper respiratory symptoms, rashes, dysuria, chest pain, shortness of breath, dizziness  Prior to Admission Medications   Prescriptions Last Dose Informant Patient Reported? Taking?   diclofenac sodium (VOLTAREN) 1 %   No No   Sig: Apply 2 g topically 3 (three) times a day as needed (Pain)      Facility-Administered Medications: None       Past Medical History:   Diagnosis Date    [facility]    R sided       History reviewed  No pertinent surgical history      Family History   Problem Relation Age of Onset    Atrial fibrillation Mother      I have reviewed and agree with the history as documented  E-Cigarette/Vaping    E-Cigarette Use Never User      E-Cigarette/Vaping Substances     Social History     Tobacco Use    Smoking status: Former Smoker     Types: Cigarettes     Start date: 3/5/2020    Smokeless tobacco: Never Used   Substance Use Topics    Alcohol use: Not Currently     Comment: Socially    Drug use: Yes     Types: Marijuana       Review of Systems   All other systems reviewed and are negative  Physical Exam  Physical Exam  Vitals signs reviewed  Constitutional:       General: He is not in acute distress  Appearance: Normal appearance  He is not ill-appearing  HENT:      Head: Normocephalic and atraumatic  Nose: Nose normal       Mouth/Throat:      Mouth: Mucous membranes are moist    Eyes:      Extraocular Movements: Extraocular movements intact  Conjunctiva/sclera: Conjunctivae normal       Pupils: Pupils are equal, round, and reactive to light  Neck:      Musculoskeletal: Neck supple  No neck rigidity or muscular tenderness  Cardiovascular:      Rate and Rhythm: Normal rate and regular rhythm  Heart sounds: Normal heart sounds  Pulmonary:      Effort: Pulmonary effort is normal       Breath sounds: Normal breath sounds  Abdominal:      General: Abdomen is flat  Palpations: Abdomen is soft  Tenderness: There is no abdominal tenderness  Musculoskeletal: Normal range of motion  General: No swelling  Skin:     General: Skin is warm and dry  Neurological:      General: No focal deficit present  Mental Status: He is alert and oriented to person, place, and time  Cranial Nerves: Cranial nerve deficit present  Sensory: Sensory deficit present  Motor: No weakness        Coordination: Coordination normal       Gait: Gait normal       Comments: Subjectively decreased sensation to the V2 distribution of the trigeminal nerve   Psychiatric:         Mood and Affect: Mood normal          Vital Signs  ED Triage Vitals [08/29/20 1710]   Temperature Pulse Respirations Blood Pressure SpO2   98 2 °F (36 8 °C) 89 16 110/62 99 %      Temp Source Heart Rate Source Patient Position - Orthostatic VS BP Location FiO2 (%)   Oral Monitor Sitting Left arm --      Pain Score       5           Vitals:    08/29/20 1710 08/29/20 1904 08/29/20 1945 08/29/20 2100   BP: 110/62 112/64 116/68 100/69   Pulse: 89 82 78 68   Patient Position - Orthostatic VS: Sitting Lying Lying Lying         Visual Acuity  Visual Acuity      Most Recent Value   L Pupil Size (mm)  3   R Pupil Size (mm)  3          ED Medications  Medications   sodium chloride 0 9 % bolus 1,000 mL (0 mL Intravenous Stopped 8/29/20 2129)   acetaminophen (TYLENOL) tablet 650 mg (650 mg Oral Given 8/29/20 1854)   metoclopramide (REGLAN) injection 10 mg (10 mg Intravenous Given 8/29/20 1858)   diphenhydrAMINE (BENADRYL) injection 25 mg (25 mg Intravenous Given 8/29/20 1855)   iohexol (OMNIPAQUE) 350 MG/ML injection (MULTI-DOSE) 85 mL (85 mL Intravenous Given 8/29/20 1934)       Diagnostic Studies  Results Reviewed     Procedure Component Value Units Date/Time    Basic metabolic panel [[id number]] Collected:  08/29/20 1849    Lab Status:  Final result Specimen:  Blood from Arm, Right Updated:  08/29/20 1915     Sodium 141 mmol/L      Potassium 3 7 mmol/L      Chloride 103 mmol/L      CO2 30 mmol/L      ANION GAP 8 mmol/L      BUN 9 mg/dL      Creatinine 1 07 mg/dL      Glucose 102 mg/dL      Calcium 8 8 mg/dL      eGFR 111 ml/min/1 73sq m     Narrative:       [place] guidelines for Chronic Kidney Disease (CKD):     Stage 1 with normal or high GFR (GFR > 90 mL/min/1 73 square meters)    Stage 2 Mild CKD (GFR = 60-89 mL/min/1 73 square meters)    Stage 3A Moderate CKD (GFR = 45-59 mL/min/1 73 square meters)    Stage 3B Moderate CKD (GFR = 30-44 mL/min/1 73 square meters)    Stage 4 Severe CKD (GFR = 15-29 mL/min/1 73 square meters)    Stage 5 End Stage CKD (GFR <15 mL/min/1 73 square meters)  Note: GFR calculation is accurate only with a steady state creatinine    CBC and differential [[id number]] Collected:  08/29/20 1849    Lab Status:  Final result Specimen:  Blood from Arm, Right Updated:  08/29/20 1900     WBC 4 84 Thousand/uL      RBC 4 26 Million/uL      Hemoglobin 13 1 g/dL      Hematocrit 40 3 %      MCV 95 fL      MCH 30 8 pg      MCHC 32 5 g/dL      RDW 12 2 %      MPV 10 3 fL      Platelets 046 Thousands/uL      nRBC 0 /100 WBCs      Neutrophils Relative 49 %      Immat GRANS % 1 %      Lymphocytes Relative 36 %      Monocytes Relative 11 %      Eosinophils Relative 2 %      Basophils Relative 1 %      Neutrophils Absolute 2 40 Thousands/µL      Immature Grans Absolute 0 03 Thousand/uL      Lymphocytes Absolute 1 74 Thousands/µL      Monocytes Absolute 0 51 Thousand/µL      Eosinophils Absolute 0 09 Thousand/µL      Basophils Absolute 0 07 Thousands/µL                  CTA head and neck with and without contrast   Final Result by Wellington Gudino MD (08/29 2051)         1  Stable mild fusiform dilatation or infundibular origin of the right MCA at the bifurcation  No stenosis, dissection or occlusion of the carotid or vertebral arteries  2   No intracranial hemorrhage, mass effect or extra-axial collection  Workstation performed: [id number]                    Procedures  Procedures         ED Course  ED Course as of Aug 30 1322   Sat Aug 29, 2020   2121 CTA shows stable appearance of MCA aneurysm, have discussed with neurosurgery who will reach out to  to facilitate sooner follow-up  Have discussed  need for lumbar puncture with patient who is now completely asymptomatic after migraine cocktail    Have discussed risks and benefits and as he is asymptomatic, without neurologic deficits, and stable appearance on CTA  I have discussed that I have low concern for [facility] at this time but cannot definitively rule this diagnosis out without LP and patient has stated that he does not wish to receive a lumbar puncture today  We have discussed return precautions including worsened thunderclap headache, worsening weakness, numbness or tingling, dizziness, vision changes, vomiting, confusion and patient states he will return needed and follow up with Neurosurgery and mother at bedside agrees  US AUDIT      Most Recent Value   Initial Alcohol Screen: US AUDIT-C    1  How often do you have a drink containing alcohol? 1 Filed at: 08/29/2020 1714   2  How many drinks containing alcohol do you have on a typical day you are drinking? 0 Filed at: 08/29/2020 1714   3a  Male UNDER 65: How often do you have five or more drinks on one occasion? 1 Filed at: 08/29/2020 1714   Audit-C Score  2 Filed at: 08/29/2020 1714                  NAV/DAST-10      Most Recent Value   How many times in the past year have you    Used an illegal drug or used a prescription medication for non-medical reasons? Never Filed at: 08/29/2020 1714                                [facility]  Number of Diagnoses or Management Options  Diagnosis management comments: Patient is a 51-year-old male past medical history of recently diagnosed intracranial aneurysm presenting for headache  Patient is well-appearing at bedside with stable vitals and in no acute distress  He has no gross abnormalities on neurologic exam   Will obtain CTA, administer migraine cocktail discussed with Neurosurgery          Disposition  Final diagnoses:   Acute nonintractable headache, unspecified headache type     Time reflects when diagnosis was documented in both MDM as applicable and the Disposition within this note     Time User Action Codes Description Comment    8/29/2020  9:23 PM Verona Christianson Add [R51] Acute nonintractable headache, unspecified headache type       ED Disposition     ED Disposition Condition Date/Time Comment    Discharge Stable Sat Aug 29, 2020  9:23 PM Alec Lovelace discharge to home/self care  Follow-up Information     Follow up With Specialties Details Why Contact Info Additional Information    Taylor Fernandez MD Neurosurgery, Radiology Schedule an appointment as soon as possible for a visit   [place] [address]  [phone]       Neurology [facility]   [address] [facility] [id number] [place] [id number] [phone] Neurology [facility], [address] [place] [address], [address] [place] [address]          Discharge Medication List as of 8/29/2020  9:25 PM      CONTINUE these medications which have NOT CHANGED    Details   diclofenac sodium (VOLTAREN) 1 % Apply 2 g topically 3 (three) times a day as needed (Pain), Starting Thu 7/23/2020, Normal           No discharge procedures on file      PDMP Review     None          ED Provider  Electronically Signed by           Darcie You DO  08/30/20 9771

## 2020-08-30 NOTE — TELEMEDICINE
On-Call Telephone Note    Contacted by Dr Kimberlyn Bethea Uri 22 y o  male MRN: [id number]  Unit/Bed#: ED 08 Encounter: [id number]    Per provider report, patient presents with mild headache and left facial numbness x 2 weeks  He was diagnosed with right M2 fusiform aneurysm on MRI imaging 8/2020 completed for left sided numbness following smoking marijuana  Similar episodes 10/2019  Patient was seen outpatient and recommended outpatient angiogram  CTA completed today reported stable findings but on personal review with Dr Elle Ace there is a small right old internal capsule stroke  Headache was mild on presentation and resolved with Reglan, fluids and Tylenol  Low concern for [address]  No reported drug use prior to this episode  /69 (BP Location: Left arm)   Pulse 68   Temp 98 2 °F (36 8 °C) (Oral)   Resp 17   Ht 6' (1 829 m)   Wt 72 6 kg (160 lb)   SpO2 100%   BMI 21 70 kg/m²      Clinical exam per provider report, neurological intact without focal deficits except subjectively decreased left cheek sensation  Imaging personally reviewed  CTA head/neck - stable fusiform dilatation right M2  Small chronic right internal capsule stroke  Assessment and Plan  1  Monitor for neurological decline  2  Given 3rd episode of numbness and finding of chronic infarct on CT imaging recommend neurology evaluation  3  Plan outpatient angiogram as planned prior    All questions answered  Provider is in agreement with the course of action  A total of 25 minutes was spent discussing the presentation, physical exam and formulating a management plan with the provider

## 2020-08-30 NOTE — DISCHARGE INSTRUCTIONS
Please return to the emergency department if you experience the following:  Chest pain  Difficulty breathing  Uncontrollable vomiting  Lightheadedness/passing out  Fevers  Seizures  Vision changes  Weakness or numbness or tingling on 1 side versus the other  Worsened or changed headache

## 2020-09-02 ENCOUNTER — TELEPHONE (OUTPATIENT)
Dept: NEUROLOGY | Facility: CLINIC | Age: 25
End: 2020-09-02

## 2020-09-02 ENCOUNTER — TELEPHONE (OUTPATIENT)
Dept: OTHER | Facility: OTHER | Age: 25
End: 2020-09-02

## 2020-09-02 ENCOUNTER — OFFICE VISIT (OUTPATIENT)
Dept: NEUROLOGY | Facility: CLINIC | Age: 25
End: 2020-09-02
Payer: COMMERCIAL

## 2020-09-02 VITALS
HEART RATE: 85 BPM | OXYGEN SATURATION: 96 % | TEMPERATURE: 99.6 F | BODY MASS INDEX: 22.4 KG/M2 | HEIGHT: 72 IN | RESPIRATION RATE: 14 BRPM | SYSTOLIC BLOOD PRESSURE: 120 MMHG | DIASTOLIC BLOOD PRESSURE: 64 MMHG | WEIGHT: 165.4 LBS

## 2020-09-02 DIAGNOSIS — I67.1 INTRACRANIAL ANEURYSM: ICD-10-CM

## 2020-09-02 DIAGNOSIS — G44.89 OTHER HEADACHE SYNDROME: Primary | ICD-10-CM

## 2020-09-02 PROCEDURE — 99214 OFFICE O/P EST MOD 30 MIN: CPT | Performed by: PSYCHIATRY & NEUROLOGY

## 2020-09-02 NOTE — TELEPHONE ENCOUNTER
"Subjective   Max Shah is a 84 y.o. male here for   Chief Complaint   Patient presents with   • Hyperlipidemia     6 month follow-up   • Hypertension   .    Vitals:    08/03/18 1104   BP: 142/74   BP Location: Left arm   Patient Position: Sitting   Cuff Size: Adult   Weight: 104 kg (230 lb 3.2 oz)   Height: 176.5 cm (69.5\")       Body mass index is 33.51 kg/m².    Hypertension   This is a chronic problem. The current episode started more than 1 year ago. The problem is unchanged. The problem is controlled. Pertinent negatives include no chest pain, palpitations or shortness of breath.   Hyperlipidemia   This is a chronic problem. The current episode started more than 1 year ago. The problem is controlled. Recent lipid tests were reviewed and are normal. He has no history of diabetes. Pertinent negatives include no chest pain or shortness of breath.        The following portions of the patient's history were reviewed and updated as appropriate: allergies, current medications, past social history and problem list.    Review of Systems   Constitutional: Positive for fatigue. Negative for chills and fever.   Respiratory: Negative for cough, shortness of breath and wheezing.    Cardiovascular: Negative for chest pain, palpitations and leg swelling.   Neurological: Positive for weakness (arms & legs feel weaker).   Psychiatric/Behavioral: Negative for dysphoric mood and sleep disturbance. The patient is not nervous/anxious.        Objective   Physical Exam   Constitutional: He appears well-developed and well-nourished. No distress.   Cardiovascular: Normal rate, regular rhythm and normal heart sounds.    Pulmonary/Chest: No respiratory distress. He has no wheezes. He has no rales. He exhibits no tenderness.   Musculoskeletal: He exhibits no edema.   Psychiatric: He has a normal mood and affect. His behavior is normal.   Nursing note and vitals reviewed.      Assessment/Plan   Diagnoses and all orders for this " PT's mother called in to leave a message for Dr Bella Vera  She reported that her son is suffering daily  He has headaches in the evening that were addressed at the ER  They told her not to return with him unless they were worsening, and she reported that they are not, that they are staying the same  Nonetheless, she wanted to touch base with the doctor about his recent CT scan at the hospital  She also wanted to see if there was any way that they could move up his appointment that is currently scheduled on the 23rd of September  Finally, her son had an appointment with his neurologist, and they encouraged his mother to inquire about any tests that may need to be done prior to his appointment, such as a spinal tap  Please give her a call tomorrow to discuss  visit:    Chronic fatigue  Comments:  he wants to go to PT again (helped his strength in the past)  Orders:  -     TSH Rfx On Abnormal To Free T4; Future  -     Vitamin D 25 Hydroxy; Future  -     TSH Rfx On Abnormal To Free T4  -     Vitamin D 25 Hydroxy    Obstructive sleep apnea syndrome  Comments:  need cpap qhs    Essential hypertension  Comments:  need weekly chk & call if over 140/90  Orders:  -     CBC Auto Differential; Future  -     Comprehensive Metabolic Panel; Future  -     Lipid Panel; Future  -     Urinalysis With Microscopic If Indicated (No Culture) - Urine, Clean Catch; Future  -     CBC Auto Differential  -     Comprehensive Metabolic Panel  -     Lipid Panel  -     Urinalysis With Microscopic If Indicated (No Culture) - Urine, Clean Catch    Other hyperlipidemia  Comments:  need diet/ex  Orders:  -     CBC Auto Differential; Future  -     Comprehensive Metabolic Panel; Future  -     Lipid Panel; Future  -     Urinalysis With Microscopic If Indicated (No Culture) - Urine, Clean Catch; Future  -     CBC Auto Differential  -     Comprehensive Metabolic Panel  -     Lipid Panel  -     Urinalysis With Microscopic If Indicated (No Culture) - Urine, Clean Catch    Balance disorder  Comments:  need PT again  Orders:  -     Ambulatory Referral to Physical Therapy Evaluate and treat, Vestibular

## 2020-09-02 NOTE — TELEPHONE ENCOUNTER
Patient's mother calling to inform Dr John Kasper that he was in the ED on 8/29 for headache  He is c/o ongoing pain that starts around 7 pm  Past hx of intracranial aneurysm  Denies any new or worsening sxs  He was using tylenol but it gives him heart palpitations  Has been using aleve instead, but not helping and he thinks it also causes palpitations  No other meds, stated he was using turmeric  Patient's mother requesting to speak to Dr John Kasper regarding this and get an appt today  Lena Rao [phone]  Okay to leave message

## 2020-09-02 NOTE — PROGRESS NOTES
Suzanne Fung is a 22 y o  male  Chief Complaint   Patient presents with    Follow-up     Patient has been having migraine, sensative to the light during the evenings it happens     Numbness     Numbness in the left side comes and goes, had 1 episode where top left side of head was numb  Assessment:  1  Other headache syndrome    2  Intracranial aneurysm        Plan:  Follow-up with Neurosurgery ASAP  Go to the hospital if has any worsening headaches and call us  Follow-up 1 month    Discussion:  Patient's recent hospital records and CT results were reviewed, he has history of intracranial aneurysm and has seen Dr Lisbeth Darby neurovascular surgeon and was advised to have a cerebral angiogram for the fusiform aneurysmal dilatation of the right M2 branch, his ER records were reviewed, neurological examination is nonfocal, it his CTA shows a stable aneurysm, I discussed with him and his mother the possibilities, it is unlikely that he is having subarachnoid hemorrhage or as her up should aneurysm because patient does not have any meningeal signs and currently he is not having any headache, other possibilities could he be having migraine headaches in addition to his intracranial aneurysm, he is not keen to go for a spinal tap, I have advised [facility], he is not keen to go on medications like Neurontin, I have advised him to keep his blood pressure cholesterol and sugar under control, to go to the hospital if has any worsening headaches or any other new neurological symptoms and to see me back in 1 month      Subjective:    HPI   Patient is here in follow-up for headaches and history of right M2 fusiform aneurysmal dilatation, he has been complaining of headache since the last 3, he recently went to the emergency room, he describes his headaches mostly on the left side about 8 on 10 associated with photophobia and phonophobia they are mostly in the left posterior sometimes they are in the right posterior, he also gets intermittent left-sided facial numbness and tingling and sometimes he would get tingling sensation in the forehead and left leg, sometimes he will have some blurriness vision of his right eye, he has been taking Tylenol p m  3 times a week and sometimes he would take an Aleve which does given some relief, no head injury, no fevers no focal weakness, no confusion, no chest pain shortness of breath or dizziness, no other complaints  Vitals:    09/02/20 1427   BP: 120/64   BP Location: Left arm   Patient Position: Sitting   Cuff Size: Standard   Pulse: 85   Resp: 14   Temp: 99 6 °F (37 6 °C)   Weight: 75 kg (165 lb 6 4 oz)   Height: 6' (1 829 m)       Current Medications    Current Outpatient Medications:     diclofenac sodium (VOLTAREN) 1 %, Apply 2 g topically 3 (three) times a day as needed (Pain), Disp: 100 g, Rfl: 1      Allergies  Patient has no known allergies  Past Medical History  Past Medical History:   Diagnosis Date    Aneurysm ([facility] ) 2010    R sided         Past Surgical History:  History reviewed  No pertinent surgical history  Family History:  Family History   Problem Relation Age of Onset    Atrial fibrillation Mother        Social History:   reports that he has quit smoking  His smoking use included cigarettes  He started smoking about 5 months ago  He has never used smokeless tobacco  He reports previous alcohol use  He reports previous drug use  Drug: Marijuana  I have reviewed the past medical history, surgical history, social and family history, current medications, allergies vitals, review of systems, and updated this information as appropriate today  Objective:    Physical Exam    Neurological Exam      GENERAL:  Cooperative in no acute distress  Well-developed and well-nourished    HEAD and NECK   Head is atraumatic normocephalic with no lesions or masses   Neck is supple with full range of motion    CARDIOVASCULAR  Carotid Arteries-no carotid bruits  NEUROLOGIC:  Mental Status-the patient is awake alert and oriented without aphasia or apraxia  Cranial Nerves: Visual fields are full to confrontation  Visual acuity is 20/25 with hand-held chart, Extraocular movements are full without nystagmus  Pupils are 2-1/2 mm and reactive  Face is symmetrical to light touch  Movements of facial expression move symmetrically  Hearing is normal to finger rub bilaterally  Soft palate lifts symmetrically  Shoulder shrug is symmetrical  Tongue is midline without atrophy  Patient did remove the mask for the exam  Motor: No drift is noted on arm extension  Strength is full in the upper and lower extremities with normal bulk and tone  Sensory: Intact to temperature and vibratory sensation in the upper and lower extremities bilaterally  Cortical function is intact  Coordination: Finger to nose testing is performed accurately  Romberg is negative  Gait reveals a normal base with symmetrical arm swing  Tandem walk is normal   Reflexes:     2+ and symmetrical  No meningeal signs,        ROS:  Review of Systems   Constitutional: Positive for fatigue  Negative for appetite change and fever  HENT: Negative  Negative for hearing loss, tinnitus, trouble swallowing and voice change  Eyes: Positive for visual disturbance ([employer] )  Negative for photophobia and pain  Respiratory: Negative  Negative for shortness of breath  Cardiovascular: Negative  Negative for palpitations  Gastrointestinal: Negative  Negative for nausea and vomiting  Endocrine: Negative  Negative for cold intolerance  Genitourinary: Negative  Negative for dysuria, frequency and urgency  Musculoskeletal: Positive for neck stiffness  Negative for myalgias and neck pain  Skin: Negative  Negative for rash  Allergic/Immunologic: Negative  Neurological: Positive for numbness and headaches   Negative for dizziness, tremors, seizures, syncope, facial asymmetry, speech difficulty, weakness and light-headedness  Hematological: Negative  Does not bruise/bleed easily  Psychiatric/Behavioral: Positive for sleep disturbance (Nightmares, waking up at night )  Negative for confusion and hallucinations  All other systems reviewed and are negative

## 2020-09-03 ENCOUNTER — TELEPHONE (OUTPATIENT)
Dept: NEUROSURGERY | Facility: CLINIC | Age: 25
End: 2020-09-03

## 2020-09-03 NOTE — TELEPHONE ENCOUNTER
Spoke with Calixto Peterson the mother of Yobany Nicole  She contacted the service yesterday with concerns about Markus's symptoms  I e  continued headaches     Had appt with Neuro yesterday to assess  He was hesitant to start taking any prescription medications to treat his headaches  Calixto Peterson stated that Dr Haleigh Cantu mentioned the possible need for LP to check for bleed? Recent CTA shows no intracranial bleeding unsure of the indication for LP at this time  She has concerns about his persistent headaches and wonders if the aneurysm could be the cause  Advised that generally aneurysms have no symptoms unless they rupture and this would present of WHOL, visual disturbance, weakness, confusion, LOC, etc and this is an emergency  If this occurs must present to ED immediately  She states his headaches improve with the use of Aleve  Has been seen by otolaryngology for sensorineural hearing loss, she said they advise headaches could be a side affect of this condition  Angio planned for 9/11/2020 with fup visit  Advised that if findings required more urgent visit would be moved up  She was appreciative of the call

## 2020-09-03 NOTE — TELEPHONE ENCOUNTER
LP would help evaluate for acute hemorrhage  His headaches are not stereotypical for that  Given the chronicity an MRI may be a better first step  He has never had acute blood on his CT scans  LP may help the neurology team evaluate him for other causes of headaches or vasculitis

## 2020-09-08 NOTE — TELEPHONE ENCOUNTER
Discussed with patient's mother, patient has an angiogram on September 11, they would wait for now and have the angiogram and then decide

## 2020-09-09 ENCOUNTER — TELEPHONE (OUTPATIENT)
Dept: RADIOLOGY | Facility: HOSPITAL | Age: 25
End: 2020-09-09

## 2020-09-09 RX ORDER — SODIUM CHLORIDE 9 MG/ML
75 INJECTION, SOLUTION INTRAVENOUS CONTINUOUS
Status: CANCELLED | OUTPATIENT
Start: 2020-09-09

## 2020-09-10 ENCOUNTER — TELEPHONE (OUTPATIENT)
Dept: INPATIENT UNIT | Facility: HOSPITAL | Age: 25
End: 2020-09-10

## 2020-09-10 ENCOUNTER — TELEPHONE (OUTPATIENT)
Dept: RADIOLOGY | Facility: HOSPITAL | Age: 25
End: 2020-09-10

## 2020-09-11 ENCOUNTER — ANESTHESIA EVENT (OUTPATIENT)
Dept: RADIOLOGY | Facility: HOSPITAL | Age: 25
End: 2020-09-11
Payer: COMMERCIAL

## 2020-09-11 ENCOUNTER — TELEPHONE (OUTPATIENT)
Dept: NEUROSURGERY | Facility: CLINIC | Age: 25
End: 2020-09-11

## 2020-09-11 ENCOUNTER — ANESTHESIA (OUTPATIENT)
Dept: RADIOLOGY | Facility: HOSPITAL | Age: 25
End: 2020-09-11
Payer: COMMERCIAL

## 2020-09-11 ENCOUNTER — TRANSCRIBE ORDERS (OUTPATIENT)
Dept: LAB | Facility: HOSPITAL | Age: 25
End: 2020-09-11

## 2020-09-11 ENCOUNTER — APPOINTMENT (OUTPATIENT)
Dept: LAB | Facility: HOSPITAL | Age: 25
End: 2020-09-11
Payer: COMMERCIAL

## 2020-09-11 ENCOUNTER — HOSPITAL ENCOUNTER (OUTPATIENT)
Dept: RADIOLOGY | Facility: HOSPITAL | Age: 25
Discharge: HOME | End: 2020-09-11
Attending: NEUROLOGICAL SURGERY | Admitting: NEUROLOGICAL SURGERY
Payer: COMMERCIAL

## 2020-09-11 VITALS
TEMPERATURE: 98 F | HEART RATE: 95 BPM | RESPIRATION RATE: 18 BRPM | HEIGHT: 73 IN | SYSTOLIC BLOOD PRESSURE: 127 MMHG | WEIGHT: 163 LBS | DIASTOLIC BLOOD PRESSURE: 74 MMHG | OXYGEN SATURATION: 100 % | BODY MASS INDEX: 21.6 KG/M2

## 2020-09-11 VITALS — HEART RATE: 73 BPM

## 2020-09-11 DIAGNOSIS — R20.2 PARESTHESIAS: ICD-10-CM

## 2020-09-11 DIAGNOSIS — I67.1 INTRACRANIAL ANEURYSM: ICD-10-CM

## 2020-09-11 DIAGNOSIS — R74.8 ELEVATED ALKALINE PHOSPHATASE LEVEL: ICD-10-CM

## 2020-09-11 DIAGNOSIS — D72.819 LEUKOPENIA, UNSPECIFIED TYPE: ICD-10-CM

## 2020-09-11 PROBLEM — R51.9 HEADACHE: Status: ACTIVE | Noted: 2020-09-11

## 2020-09-11 PROBLEM — F17.200 SMOKING: Status: ACTIVE | Noted: 2020-09-11

## 2020-09-11 PROBLEM — IMO0001 SMOKING: Status: ACTIVE | Noted: 2020-09-11

## 2020-09-11 LAB
ANION GAP SERPL CALCULATED.3IONS-SCNC: 4 MMOL/L (ref 4–13)
APTT PPP: 29 SECONDS (ref 23–37)
BASOPHILS # BLD AUTO: 0.03 THOUSANDS/ΜL (ref 0–0.1)
BASOPHILS NFR BLD AUTO: 1 % (ref 0–1)
BUN SERPL-MCNC: 17 MG/DL (ref 5–25)
CALCIUM SERPL-MCNC: 8.9 MG/DL (ref 8.3–10.1)
CHLORIDE SERPL-SCNC: 109 MMOL/L (ref 100–108)
CO2 SERPL-SCNC: 28 MMOL/L (ref 21–32)
CREAT SERPL-MCNC: 0.91 MG/DL (ref 0.6–1.3)
CRP SERPL QL: <3 MG/L
EOSINOPHIL # BLD AUTO: 0.22 THOUSAND/ΜL (ref 0–0.61)
EOSINOPHIL NFR BLD AUTO: 5 % (ref 0–6)
ERYTHROCYTE [DISTWIDTH] IN BLOOD BY AUTOMATED COUNT: 12.5 % (ref 11.6–15.1)
ERYTHROCYTE [SEDIMENTATION RATE] IN BLOOD: 6 MM/HOUR (ref 0–14)
EST. AVERAGE GLUCOSE BLD GHB EST-MCNC: 108 MG/DL
GFR SERPL CREATININE-BSD FRML MDRD: 135 ML/MIN/1.73SQ M
GLUCOSE P FAST SERPL-MCNC: 78 MG/DL (ref 65–99)
HBA1C MFR BLD: 5.4 %
HCT VFR BLD AUTO: 40.7 % (ref 36.5–49.3)
HGB BLD-MCNC: 13.1 G/DL (ref 12–17)
IMM GRANULOCYTES # BLD AUTO: 0.02 THOUSAND/UL (ref 0–0.2)
IMM GRANULOCYTES NFR BLD AUTO: 0 % (ref 0–2)
INR PPP: 1.05 (ref 0.84–1.19)
LYMPHOCYTES # BLD AUTO: 1.87 THOUSANDS/ΜL (ref 0.6–4.47)
LYMPHOCYTES NFR BLD AUTO: 40 % (ref 14–44)
MCH RBC QN AUTO: 30.8 PG (ref 26.8–34.3)
MCHC RBC AUTO-ENTMCNC: 32.2 G/DL (ref 31.4–37.4)
MCV RBC AUTO: 96 FL (ref 82–98)
MONOCYTES # BLD AUTO: 0.69 THOUSAND/ΜL (ref 0.17–1.22)
MONOCYTES NFR BLD AUTO: 15 % (ref 4–12)
NEUTROPHILS # BLD AUTO: 1.9 THOUSANDS/ΜL (ref 1.85–7.62)
NEUTS SEG NFR BLD AUTO: 39 % (ref 43–75)
NRBC BLD AUTO-RTO: 0 /100 WBCS
PLATELET # BLD AUTO: 194 THOUSANDS/UL (ref 149–390)
PMV BLD AUTO: 10.8 FL (ref 8.9–12.7)
POTASSIUM SERPL-SCNC: 4.1 MMOL/L (ref 3.5–5.3)
PROTHROMBIN TIME: 13.7 SECONDS (ref 11.6–14.5)
RBC # BLD AUTO: 4.26 MILLION/UL (ref 3.88–5.62)
SODIUM SERPL-SCNC: 141 MMOL/L (ref 136–145)
WBC # BLD AUTO: 4.73 THOUSAND/UL (ref 4.31–10.16)

## 2020-09-11 PROCEDURE — 83036 HEMOGLOBIN GLYCOSYLATED A1C: CPT

## 2020-09-11 PROCEDURE — 86618 LYME DISEASE ANTIBODY: CPT

## 2020-09-11 PROCEDURE — 36224 PLACE CATH CAROTD ART: CPT

## 2020-09-11 PROCEDURE — 85730 THROMBOPLASTIN TIME PARTIAL: CPT

## 2020-09-11 PROCEDURE — 76937 US GUIDE VASCULAR ACCESS: CPT | Performed by: NEUROLOGICAL SURGERY

## 2020-09-11 PROCEDURE — 85025 COMPLETE CBC W/AUTO DIFF WBC: CPT

## 2020-09-11 PROCEDURE — 36415 COLL VENOUS BLD VENIPUNCTURE: CPT

## 2020-09-11 PROCEDURE — 86140 C-REACTIVE PROTEIN: CPT

## 2020-09-11 PROCEDURE — C1894 INTRO/SHEATH, NON-LASER: HCPCS

## 2020-09-11 PROCEDURE — 36224 PLACE CATH CAROTD ART: CPT | Performed by: NEUROLOGICAL SURGERY

## 2020-09-11 PROCEDURE — 85610 PROTHROMBIN TIME: CPT

## 2020-09-11 PROCEDURE — 80048 BASIC METABOLIC PNL TOTAL CA: CPT

## 2020-09-11 PROCEDURE — 85652 RBC SED RATE AUTOMATED: CPT

## 2020-09-11 PROCEDURE — 76377 3D RENDER W/INTRP POSTPROCES: CPT | Performed by: NEUROLOGICAL SURGERY

## 2020-09-11 PROCEDURE — NC001 PR NO CHARGE: Performed by: NEUROLOGICAL SURGERY

## 2020-09-11 PROCEDURE — 36226 PLACE CATH VERTEBRAL ART: CPT

## 2020-09-11 PROCEDURE — 36226 PLACE CATH VERTEBRAL ART: CPT | Performed by: NEUROLOGICAL SURGERY

## 2020-09-11 PROCEDURE — C1769 GUIDE WIRE: HCPCS

## 2020-09-11 PROCEDURE — 76937 US GUIDE VASCULAR ACCESS: CPT

## 2020-09-11 RX ORDER — SODIUM CHLORIDE 9 MG/ML
75 INJECTION, SOLUTION INTRAVENOUS CONTINUOUS
Status: DISCONTINUED | OUTPATIENT
Start: 2020-09-11 | End: 2020-09-11 | Stop reason: HOSPADM

## 2020-09-11 RX ORDER — FENTANYL CITRATE 50 UG/ML
INJECTION, SOLUTION INTRAMUSCULAR; INTRAVENOUS AS NEEDED
Status: DISCONTINUED | OUTPATIENT
Start: 2020-09-11 | End: 2020-09-11

## 2020-09-11 RX ORDER — NITROGLYCERIN 20 MG/100ML
INJECTION INTRAVENOUS CODE/TRAUMA/SEDATION MEDICATION
Status: COMPLETED | OUTPATIENT
Start: 2020-09-11 | End: 2020-09-11

## 2020-09-11 RX ORDER — VERAPAMIL HYDROCHLORIDE 2.5 MG/ML
INJECTION, SOLUTION INTRAVENOUS CODE/TRAUMA/SEDATION MEDICATION
Status: COMPLETED | OUTPATIENT
Start: 2020-09-11 | End: 2020-09-11

## 2020-09-11 RX ORDER — PROPOFOL 10 MG/ML
INJECTION, EMULSION INTRAVENOUS AS NEEDED
Status: DISCONTINUED | OUTPATIENT
Start: 2020-09-11 | End: 2020-09-11

## 2020-09-11 RX ORDER — HEPARIN SODIUM 1000 [USP'U]/ML
INJECTION, SOLUTION INTRAVENOUS; SUBCUTANEOUS CODE/TRAUMA/SEDATION MEDICATION
Status: COMPLETED | OUTPATIENT
Start: 2020-09-11 | End: 2020-09-11

## 2020-09-11 RX ORDER — LIDOCAINE HYDROCHLORIDE 10 MG/ML
INJECTION, SOLUTION EPIDURAL; INFILTRATION; INTRACAUDAL; PERINEURAL CODE/TRAUMA/SEDATION MEDICATION
Status: COMPLETED | OUTPATIENT
Start: 2020-09-11 | End: 2020-09-11

## 2020-09-11 RX ADMIN — SODIUM CHLORIDE 75 ML/HR: 0.9 INJECTION, SOLUTION INTRAVENOUS at 07:58

## 2020-09-11 RX ADMIN — LIDOCAINE HYDROCHLORIDE 3 ML: 10 INJECTION, SOLUTION EPIDURAL; INFILTRATION; INTRACAUDAL; PERINEURAL at 10:08

## 2020-09-11 RX ADMIN — VERAPAMIL HYDROCHLORIDE 5 MG: 2.5 INJECTION INTRAVENOUS at 10:15

## 2020-09-11 RX ADMIN — PROPOFOL 20 MG: 10 INJECTION, EMULSION INTRAVENOUS at 10:16

## 2020-09-11 RX ADMIN — IODIXANOL 130 ML: 320 INJECTION, SOLUTION INTRAVASCULAR at 10:57

## 2020-09-11 RX ADMIN — FENTANYL CITRATE 50 MCG: 50 INJECTION, SOLUTION INTRAMUSCULAR; INTRAVENOUS at 09:52

## 2020-09-11 RX ADMIN — PROPOFOL 20 MG: 10 INJECTION, EMULSION INTRAVENOUS at 10:12

## 2020-09-11 RX ADMIN — LIDOCAINE HYDROCHLORIDE 1 ML: 10 INJECTION, SOLUTION EPIDURAL; INFILTRATION; INTRACAUDAL; PERINEURAL at 10:15

## 2020-09-11 RX ADMIN — PROPOFOL 10 MG: 10 INJECTION, EMULSION INTRAVENOUS at 10:14

## 2020-09-11 RX ADMIN — Medication 600 MCG: at 10:08

## 2020-09-11 RX ADMIN — PROPOFOL 20 MG: 10 INJECTION, EMULSION INTRAVENOUS at 10:10

## 2020-09-11 RX ADMIN — HEPARIN SODIUM 3000 UNITS: 1000 INJECTION INTRAVENOUS; SUBCUTANEOUS at 10:15

## 2020-09-11 RX ADMIN — PROPOFOL 60 MG: 10 INJECTION, EMULSION INTRAVENOUS at 10:07

## 2020-09-11 RX ADMIN — PROPOFOL 20 MG: 10 INJECTION, EMULSION INTRAVENOUS at 10:09

## 2020-09-11 RX ADMIN — Medication 400 MCG: at 10:15

## 2020-09-11 NOTE — DISCHARGE INSTRUCTIONS
Today, you underwent a diagnostic cerebral angiogram under the care of Dr Roldan Joel for evaluation of aneurysm  ? The following instructions will help you care for yourself, or be cared for upon your return home today  These are guidelines for your care right after your surgery only  ? Notify Your Doctor or Nurse if you have any of the following:  ? SYMPTOMS OF WOUND INFECTION--   Increased pain in or around the incision   Swelling around the incision  Any drainage from the incision  Incision separates or opens up  Warmth in the tissues around the incision  Redness or tenderness on the skin near the incision   Fever (temperature greater than 101 degrees F)   ? NEUROLOGICAL CHANGES--  Change in alertness  Increased sleepiness   Nausea and vomiting   New onset of numbness or weakness in arms or legs   New problems with your bowels or bladder  New or worse problems with balance or walking  Seizures, new or worsening  ? UNRELIEVED HEADACHE PAIN--  New or increased pain unrelieved with pain medications   Pain associated with nausea and vomiting   Pain associated with other symptoms  ? QUESTIONS OR PROBLEMS--  Any questions or problems that you are unsure about  Wound Care:  Keep Incision Clean and Dry   You may shower daily, but do not soak incision  Pat dry after showering  No tub baths, soaking, swimming for 1 week after angiogram    You do not need to cover the incision  Mild to moderate bruising and tenderness to the site is expected and may last up to 1-2 weeks after your procedure  ?  A closure device was placed at the catheter insertion site  This is MRI compatible  Remove the dressing 24 hours after your procedure  If your groin site is bleeding, apply firm pressure for 10 minutes  Reinforce dressing rather than removing and checking frequently  If continues to bleed through the dressing after 1 hour, contact your neurosurgeon's office  Anticipatory Education:  ?   PAIN MED W/ Acetaminophen (Tylenol)  --IF a prescription for pain medicine has been sent home with you:  --Narcotic pain medication may cause constipation  Be sure to take stool softeners or laxatives while you are on narcotic pain medication  --Do not drive after taking prescription pain medicine  ?  If this medicine is too strong, or no longer necessary, or we did NOT recommend/prescribe oral narcotics, you may take:   - Tylenol Extra-strength/Acetaminophen, 2 tablets every 4-6 hours as needed for mild pain  DO NOT TAKE MORE THAN 4000MG PER DAY from combined sources  NOTE: Remember to eat when taking pain medicines in order to avoid nausea  Watch for constipation  Eat plenty of fruits, vegetables, juices, and drink 6-8 glasses of water each day  Constipation: Stay active and drink at least 6-8 cups of fluid each day to prevent constipation  If you need a laxative or stool softener follow the package directions or consult with your local pharmacists if you have questions  ? After anesthesia, rest for 24 hours  Do not drive, drink alcohol beverages or make any important decisions during this time  General anesthesia may cause sore throat, jaw discomfort or muscle aches  These symptoms can last for one or two days  Activity: Please follow these instructions:  Advance your activity as you can tolerate  You may do light house work; nothing strenuous   You may walk all you want  You may go up and down the steps  Use the railing for support  Do not do excessive bending, straining or heavy lifting for 48 hours after your procedure  Do not drive or return to work until you are instructed   It is normal for your energy level and sleep patterns to change after surgery  Get extra sleep at night and take naps during the day to help you feel less tired  Take rest periods during the day  Complete recovery may take several weeks  ?  You may resume driving after 32-26 hours recovery  You may return to work after 48 hours of recovery  ?  Diet:  Your doctor has recommended that you follow these diet instructions at home  Refer to the patient education materials you received during your hospital stay  If you would like more nutrition counseling, ask your doctor about making an appointment with an outpatient dietitian  Resume your home diet  ? Medications:  Please resume your home medications as instructed  ? Home Supplies and Equipment:  none  Additional Contacts:  ? CONTACTS FOR NEUROSURGERY: You may call your neurosurgeons office if you have questions between 8:30 am and 4:30 pm  You may request to speak to the nurse practitioner who is available Monday through Friday  ?  For off hours or the weekend you may call your neurosurgeon's office to leave a message

## 2020-09-11 NOTE — SEDATION DOCUMENTATION
R radial approach for diagnostic cerebral Angiogram preformed by Dr Aleida Mancilla  Pt tolerated well  Denies pain post procedure, On RA  Snuff band in place to R wrist - hemostasis maintained  Report called to MARIA ISABEL, RN

## 2020-09-11 NOTE — TELEPHONE ENCOUNTER
Received a call from ISABEL GAFFNEY reporting that upon arriving home Markus expressed he was having his "worse headache yet" and have concerns that there is something wrong  Upon calling ISABEL GAFFNEY back she said she had already called [facility] and spoke directly with Dr Aniket Alejandra  He was able to provide a brief review of the angiogram and discuss side effects of the procedure  Directed to take Aleve at this time  She is aware [facility] should come to the ED  She was appreciative of the call back

## 2020-09-11 NOTE — H&P
Patient seen and examined independently  Clinic note from 8/10/20 remains current  Patient is not on any new medications and has not had any new medical problems     /61 (BP Location: Right arm)   Pulse 75   Temp 98 °F (36 7 °C) (Oral)   Resp 18   Ht 6' 1" (1 854 m)   Wt 73 9 kg (163 lb)   SpO2 100%   BMI 21 51 kg/m²  On exam, patient is neurologically intact  Heart rate is regular  Breath sounds are clear  Plan to proceed with diagnostic cerebral arteriogram to better delineate right M2 segment dilitation

## 2020-09-11 NOTE — OP NOTE
OPERATIVE REPORT  PATIENT NAME: Maykel Stone     :  1995  MRN: [id number]   Pt Location: Interventional radiology    SURGERY DATE: 20     Preop Diagnosis:  1  Incidental aneurysm    Postop Diagnosis  1  Incidental aneurysm    Procedure:  Use of ultrasound  Right radial arteriogram   Right Common Carotid Arteriogram  Right Internal Carotid Arteriogram  A 3D rotational angiogram of the DENIA was then done  Post-processed images were reviewed at a separate work station  Left Common Carotid Arteriogram  Left Internal Carotid Arteriogram  Left Vertebral Artery Arteriogram    Surgeon:   Cecille Zamora MD    Specimen(s):  None    Estimated Blood Loss:   None    Drains:  None    Anesthesia Type:   Monitored Anesthesia Care     Complications:  None    Operative Indications:  Maykel Stone  is a very pleasant 22 y o  male who was found to have incidental R M2 fusiform dilitation  After discussing the risks and benefits of a diagnostic cerebral arteriogram including bleeding, stroke, groin hematoma, and death the patient elected to proceed  Procedure Details:  After obtaining written informed consent, the patient was brought into the operating room and moved to the OR table in supine fashion  The right radial artery was prepped and draped in the usual sterile fashion  Monitored anesthesia care was induced  A surgical time-out was performed  3 cc mixture of lidocaine and 400 mcg of nitroglycerin was injected immediately above the right radial artery  Using ultrasound guidance percutaneous access to the right radial artery was obtained  A 5 Lao tapered sheath was then placed  Next and infusion of 300 mcg of nitroglycerin, 3000 units heparin, and 5 mg of verapamil were slowly injected intra-arterially through the right radial sheath  Radial artery arteriogram then performed  Next a 5 Western Radha Curz glide catheter was advanced and reshaped      The catheter was then advanced into the aortic arch and advanced into the left vertebral artery  Transfascial lateral and oblique images of the left vertebral artery intracranial circulation were obtained  The catheter was then withdrawn into the aortic arch and advanced into the left common carotid artery  AP lateral and oblique images of the left cervical carotid were obtained  The catheter was then advanced and the left internal carotid artery was then catheterized  AP lateral and magnified oblique images of the left intracranial carotid circulation were obtained  The right common carotid artery was then catheterized  AP lateral and oblique images of the right cervical carotid were obtained  The catheter was then advanced and the right internal carotid artery was then catheterized  AP lateral and magnified oblique images of the right intracranial carotid circulation were obtained  A 3D rotational angiogram of the DENIA was then done  Post-processed images were reviewed at a separate work station  The catheter was then withdrawn from the body and a TR compression band was placed  There was appropriate hemostasis  The patient was then awoken from monitored anesthesia care and found to be in baseline neurologic condition  All sponge and needle counts were correct  INTERPRETATION OF ANGIOGRAPHIC FINDINGS:   1  The Left vertebral intracranial circulation reveals retrograde reflux down the contralateral vertebral artery  Both PICAs are well visualized  Antegrade flow is present into all the posterior circulation branches  There are no AVMs or aneurysms  The capillary and venous phases are unremarkable  2  The Left common carotid circulation reveals antegrade flow into the internal and external carotid arteries  There is no hemodynamically significant carotid stenosis as defined by NASCET criteria        3  The Left internal carotid artery circulation reveals antegrade flow into the middle cerebral and anterior cerebral arteries  There is a patent anterior communicating artery  There is a patent posterior communicating artery  There is no evidence of aneurysm, AVM, or vascular malformation  The capillary and venous phases are unremarkable  4  The Right common carotid circulation reveals antegrade flow into the internal and external carotid arteries  There is no hemodynamically significant carotid stenosis as defined by NASCET criteria  5  The Right internal carotid artery circulation reveals antegrade flow into the middle cerebral and anterior cerebral arteries  There is a patent anterior communicating artery  There is a patent posterior communicating artery  There is mild fusiform dilatation of the M2 division, small blister  The capillary and venous phases are unremarkable  6  A 3D rotational angiogram of the DENIA was then done  Post-processed images were reviewed at a separate work station  Demonstrates bulbous nature of inferior M2 take off   No saccular aneurysm    Impression:  Mild right inf M2 origin dilatation     Patient Disposition:  hemodynamically stable    SIGNATURE: Evette Spencer MD  DATE: 09/11/20   TIME: 11:03 AM

## 2020-09-11 NOTE — ANESTHESIA POSTPROCEDURE EVALUATION
Post-Op Assessment Note    CV Status:  Stable  Pain Score: 0    Pain management: adequate     Mental Status:  Alert and awake   Hydration Status:  Euvolemic   PONV Controlled:  Controlled   Airway Patency:  Patent      Post Op Vitals Reviewed: Yes      Staff: CRNA         No complications documented      BP   90/53   Temp      Pulse  68   Resp   10   SpO2   100

## 2020-09-11 NOTE — ANESTHESIA PREPROCEDURE EVALUATION
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  History of cerebral aneurysm at 16 years   (+) Headache (Migraines, senstive to light, uses tylenol to treat)   (-) Paresthesia (denies)      PULMONARY   (+) Smoking (Smoked cigarettes but quit in 2016  Smoked marijuana but quit in July 2019)        Physical Exam    Airway    Mallampati score: II  TM Distance: >3 FB  Neck ROM: full     Dental   No notable dental hx     Cardiovascular  Rhythm: regular, Rate: normal,     Pulmonary  Breath sounds clear to auscultation,     Other Findings        TTE SUMMARY (11/2019)     LEFT VENTRICLE:  Systolic function was normal  Ejection fraction was estimated in the range of 50 % to 55 %  There were no regional wall motion abnormalities  Anesthesia Plan  ASA Score- 2     Anesthesia Type- IV sedation with anesthesia with ASA Monitors  Additional Monitors:   Airway Plan:           Plan Factors-Exercise tolerance (METS): >4 METS  Chart reviewed  EKG reviewed  Existing labs reviewed  Patient summary reviewed  Induction- intravenous  Postoperative Plan-     Informed Consent- Anesthetic plan and risks discussed with patient and mother  I personally reviewed this patient with the CRNA  Discussed and agreed on the Anesthesia Plan with the CRNA  Annemarie Calle

## 2020-09-12 LAB — B BURGDOR IGG+IGM SER-ACNC: <0.91 ISR (ref 0–0.9)

## 2020-09-23 ENCOUNTER — OFFICE VISIT (OUTPATIENT)
Dept: NEUROSURGERY | Facility: CLINIC | Age: 25
End: 2020-09-23
Payer: COMMERCIAL

## 2020-09-23 VITALS
BODY MASS INDEX: 21.34 KG/M2 | WEIGHT: 161 LBS | SYSTOLIC BLOOD PRESSURE: 96 MMHG | TEMPERATURE: 98.5 F | HEIGHT: 73 IN | HEART RATE: 72 BPM | RESPIRATION RATE: 16 BRPM | DIASTOLIC BLOOD PRESSURE: 64 MMHG

## 2020-09-23 DIAGNOSIS — I67.1 INTRACRANIAL ANEURYSM: Primary | ICD-10-CM

## 2020-09-23 PROCEDURE — 99214 OFFICE O/P EST MOD 30 MIN: CPT | Performed by: NEUROLOGICAL SURGERY

## 2020-09-23 NOTE — PROGRESS NOTES
Patient Id: Lesley Bryant is a 22 y o  male        Handedness: Right      Assessment/Plan:    Diagnoses and all orders for this visit:    Intracranial aneurysm  -     MRI angiogram head without contrast; Future        Discussion Summary:   1  Fusiform aneurysmal dilatation of right M2 branch, status post angiogram 09/11/2020  We reviewed his angiogram in detail  There appears to be fusiform dilatation of the distal M1 and M2 segment  There appears to be a small blister along this as opposed to discrete aneurysm  His distal vessel also appears to be dilated and fusiform fashion  At this juncture I would like to continue monitoring this  Per report he has prior imaging for which I asked the mother to obtain copies of  Should he have progression on follow-up imaging we can consider off-label pipeline embolization or bypass surgery if necessary  I counseled him on the signs and symptoms of subarachnoid hemorrhage as well as the natural history of this disease  I will plan to see him back in 1 year with an MRA  Chief Complaint: Follow-up        HPI:   This is a very pleasant 42-year-old gentleman who in October had an episode of left-sided arm and leg numbness after smoking marijuana  This did not resolve for several days with the left leg and about 2 weeks in the left leg  As such he presented to Neurology  MRI of his brain and CTA were performed  MRI was negative for ischemic events or sequelae however both the MRI and CTA were notable for a enlarged and dilated right M2 branch concerning for fusiform aneurysm  As such he was referred for evaluation  He underwent a formal arteriogram approximately 2 weeks ago  He does have intermittent headaches still  This was worse immediately after the arteriogram and has improved  He denies any new numbness tingling or weakness      His past medical history is insignificant  He has never had surgery before      He is not allergic to any medications  He takes no medications      He is single  He is employed as a musician  He has no children  He uses marijuana intermittently  He quit smoking in 2020  He previously smoked about 1 pack a week  He denies any use of illicit drugs, specifically heroin, cocaine, methamphetamine      He has no family history of aneurysms that he is aware of he states that his grandmother and aunt  of strokes  Review of systems obtained by the MA reviewed and updated below  Review of Systems   Constitutional: Negative  HENT: Negative  Eyes: Positive for photophobia  Negative for pain and visual disturbance  Respiratory: Negative  Cardiovascular: Negative  Gastrointestinal: Negative  Endocrine: Negative  Genitourinary: Negative  Musculoskeletal: Negative  Allergic/Immunologic: Negative  Neurological: Negative  Hematological: Negative  Psychiatric/Behavioral: Negative  Physical Exam  Vitals:    20 1623   BP: 96/64   Pulse: 72   Resp: 16   Temp: 98 5 °F (36 9 °C)   He is well appearing  Affect is appropriate  His BMI is Body mass index is 21 24 kg/m²  Amy Tobin He is awake alert and oriented  Hearing and vision are grossly intact  His pupils are equal round reactive to light  His extraocular movements are intact  His face is symmetric  Tongue is midline  Facial sensation is intact and symmetric throughout  Shoulder shrug is 5/5  There is no drift or dysmetria  He has full strength in his bilateral upper and lower extremities  He has normal muscle tone muscle bulk  His gait is normal     His heart rate is regular  Normal respiratory effort    2+ radial pulses      The following portions of the patient's history were reviewed and updated as appropriate: allergies, current medications, past family history, past medical history, past social history, past surgical history and problem list     Active Ambulatory Problems     Diagnosis Date Noted    Chest pressure 11/04/2019    Restrictions of the diet 11/04/2019    Pain of toe of right foot 07/23/2020    Intracranial aneurysm 08/10/2020    Other headache syndrome 09/02/2020    Headache 09/11/2020    Smoking 09/11/2020     Resolved Ambulatory Problems     Diagnosis Date Noted    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

## 2020-12-03 ENCOUNTER — OFFICE VISIT (OUTPATIENT)
Dept: NEUROLOGY | Facility: CLINIC | Age: 25
End: 2020-12-03
Payer: COMMERCIAL

## 2020-12-03 VITALS — HEIGHT: 74 IN | BODY MASS INDEX: 20.92 KG/M2 | WEIGHT: 163 LBS

## 2020-12-03 DIAGNOSIS — G44.89 OTHER HEADACHE SYNDROME: Primary | ICD-10-CM

## 2020-12-03 DIAGNOSIS — I67.1 INTRACRANIAL ANEURYSM: ICD-10-CM

## 2020-12-03 PROCEDURE — 3008F BODY MASS INDEX DOCD: CPT | Performed by: PSYCHIATRY & NEUROLOGY

## 2020-12-03 PROCEDURE — 99213 OFFICE O/P EST LOW 20 MIN: CPT | Performed by: PSYCHIATRY & NEUROLOGY

## 2021-05-24 ENCOUNTER — TELEPHONE (OUTPATIENT)
Dept: NEUROSURGERY | Facility: CLINIC | Age: 26
End: 2021-05-24

## 2021-05-24 NOTE — TELEPHONE ENCOUNTER
Received a call from [facility] with concerns over exertional headaches  He said this has been going on for quite a few months and does not recall getting them prior to his angio  Denies visual changes, confusion, slurred speech, amb dysfunction, WHOL  He is scheduled for fup with Dr Danitza Araujo [facility]) in Sept with MRA  Discussed with Dr Danitza Araujo who does not feel moving his imaging and appt is necessary  Contacted patient and directed him to contact neurology regarding persistent headaches  He is scheduled for fup 6/10 with Dr Amanda Mead

## 2021-07-09 NOTE — TELEPHONE ENCOUNTER
Patient's mother called nurseline with concerns about patient having exertional headaches  Returned call to ISABEL GAFFNEY who states patient has been having exertional headaches whenever he tries to workout  States he also experiences pressure behind his eyes during these headache episodes  ISABEL GAFFNEY stated these headaches move around his head  States they only happen when patient tries to workout or run  Advised patient should be following with neurology for these headaches  ISABEL GAFFNEY stated patient is not fond of the neurologist he is currently seeing  Suggested they try another neurologist to help with these headaches as our office does not treat headaches  ISABEL GAFFNEY persists that patient needs to be followed up with our office as she believed these symptoms are from patient's angio  Reminded ISABEL GAFFNEY patient does have a follow up appt in September along with a scan  ISABEL GAFFNEY stated this would be a good time to f/u as patient is currently traveling in [place]  Advised will route to Dr Mor Jacobs as an FYI to make him aware of patient's symptoms

## 2021-08-30 ENCOUNTER — TELEPHONE (OUTPATIENT)
Dept: NEUROSURGERY | Facility: CLINIC | Age: 26
End: 2021-08-30

## 2021-08-30 NOTE — TELEPHONE ENCOUNTER
8/30/21  CALLED [employer] [phone]  SPOKE TO JAYMIE  PT CURRENTLY HAS NO COVERAGE AS OF 5/20/21  CALLED PT L/M TO CALL BACK TO GET CURRENT INSURANCE  INFORMATION  PT IS SCHEDULED FOR MR HEAD SL DONAVAN  9/13/21

## 2023-07-10 ENCOUNTER — TELEPHONE (OUTPATIENT)
Age: 28
End: 2023-07-10

## 2023-07-12 ENCOUNTER — TELEPHONE (OUTPATIENT)
Age: 28
End: 2023-07-12

## 2023-11-17 ENCOUNTER — APPOINTMENT (EMERGENCY)
Dept: CT IMAGING | Facility: HOSPITAL | Age: 28
End: 2023-11-17

## 2023-11-17 ENCOUNTER — HOSPITAL ENCOUNTER (EMERGENCY)
Facility: HOSPITAL | Age: 28
Discharge: HOME | End: 2023-11-17
Attending: EMERGENCY MEDICINE

## 2023-11-17 VITALS
HEART RATE: 67 BPM | OXYGEN SATURATION: 100 % | RESPIRATION RATE: 18 BRPM | SYSTOLIC BLOOD PRESSURE: 92 MMHG | HEIGHT: 74 IN | BODY MASS INDEX: 22.97 KG/M2 | TEMPERATURE: 98.2 F | DIASTOLIC BLOOD PRESSURE: 61 MMHG | WEIGHT: 179.01 LBS

## 2023-11-17 DIAGNOSIS — I67.1 ANEURYSM OF MIDDLE CEREBRAL ARTERY: ICD-10-CM

## 2023-11-17 DIAGNOSIS — R51.9 HEADACHE: Primary | ICD-10-CM

## 2023-11-17 LAB
ANION GAP SERPL CALCULATED.3IONS-SCNC: 3 MMOL/L
APTT PPP: 29 SECONDS (ref 23–37)
BASOPHILS # BLD AUTO: 0.06 THOUSANDS/ÂΜL (ref 0–0.1)
BASOPHILS NFR BLD AUTO: 2 % (ref 0–1)
BUN SERPL-MCNC: 9 MG/DL (ref 5–25)
CALCIUM SERPL-MCNC: 9.1 MG/DL (ref 8.4–10.2)
CHLORIDE SERPL-SCNC: 106 MMOL/L (ref 96–108)
CO2 SERPL-SCNC: 28 MMOL/L (ref 21–32)
CREAT SERPL-MCNC: 0.9 MG/DL (ref 0.6–1.3)
EOSINOPHIL # BLD AUTO: 0.22 THOUSAND/ÂΜL (ref 0–0.61)
EOSINOPHIL NFR BLD AUTO: 7 % (ref 0–6)
ERYTHROCYTE [DISTWIDTH] IN BLOOD BY AUTOMATED COUNT: 12.8 % (ref 11.6–15.1)
GFR SERPL CREATININE-BSD FRML MDRD: 115 ML/MIN/1.73SQ M
GLUCOSE SERPL-MCNC: 92 MG/DL (ref 65–140)
HCT VFR BLD AUTO: 39.5 % (ref 36.5–49.3)
HGB BLD-MCNC: 12.8 G/DL (ref 12–17)
IMM GRANULOCYTES # BLD AUTO: 0.02 THOUSAND/UL (ref 0–0.2)
IMM GRANULOCYTES NFR BLD AUTO: 1 % (ref 0–2)
INR PPP: 0.99 (ref 0.84–1.19)
LYMPHOCYTES # BLD AUTO: 1.52 THOUSANDS/ÂΜL (ref 0.6–4.47)
LYMPHOCYTES NFR BLD AUTO: 45 % (ref 14–44)
MCH RBC QN AUTO: 29.9 PG (ref 26.8–34.3)
MCHC RBC AUTO-ENTMCNC: 32.4 G/DL (ref 31.4–37.4)
MCV RBC AUTO: 92 FL (ref 82–98)
MONOCYTES # BLD AUTO: 0.31 THOUSAND/ÂΜL (ref 0.17–1.22)
MONOCYTES NFR BLD AUTO: 9 % (ref 4–12)
NEUTROPHILS # BLD AUTO: 1.22 THOUSANDS/ÂΜL (ref 1.85–7.62)
NEUTS SEG NFR BLD AUTO: 36 % (ref 43–75)
NRBC BLD AUTO-RTO: 0 /100 WBCS
PLATELET # BLD AUTO: 235 THOUSANDS/UL (ref 149–390)
PMV BLD AUTO: 9.7 FL (ref 8.9–12.7)
POTASSIUM SERPL-SCNC: 4.1 MMOL/L (ref 3.5–5.3)
PROTHROMBIN TIME: 13.7 SECONDS (ref 11.6–14.5)
RBC # BLD AUTO: 4.28 MILLION/UL (ref 3.88–5.62)
SODIUM SERPL-SCNC: 137 MMOL/L (ref 135–147)
WBC # BLD AUTO: 3.35 THOUSAND/UL (ref 4.31–10.16)

## 2023-11-17 PROCEDURE — 99285 EMERGENCY DEPT VISIT HI MDM: CPT | Performed by: EMERGENCY MEDICINE

## 2023-11-17 PROCEDURE — 85610 PROTHROMBIN TIME: CPT

## 2023-11-17 PROCEDURE — 70496 CT ANGIOGRAPHY HEAD: CPT

## 2023-11-17 PROCEDURE — 80048 BASIC METABOLIC PNL TOTAL CA: CPT

## 2023-11-17 PROCEDURE — 85025 COMPLETE CBC W/AUTO DIFF WBC: CPT

## 2023-11-17 PROCEDURE — 36415 COLL VENOUS BLD VENIPUNCTURE: CPT

## 2023-11-17 PROCEDURE — 70498 CT ANGIOGRAPHY NECK: CPT

## 2023-11-17 PROCEDURE — 99284 EMERGENCY DEPT VISIT MOD MDM: CPT

## 2023-11-17 PROCEDURE — 85730 THROMBOPLASTIN TIME PARTIAL: CPT

## 2023-11-17 PROCEDURE — G1004 CDSM NDSC: HCPCS

## 2023-11-17 RX ORDER — ACETAMINOPHEN 325 MG/1
650 TABLET ORAL ONCE
Status: COMPLETED | OUTPATIENT
Start: 2023-11-17 | End: 2023-11-17

## 2023-11-17 RX ADMIN — IOHEXOL 100 ML: 350 INJECTION, SOLUTION INTRAVENOUS at 10:58

## 2023-11-17 RX ADMIN — ACETAMINOPHEN 650 MG: 325 TABLET, FILM COATED ORAL at 10:17

## 2023-11-17 NOTE — ED PROVIDER NOTES
History  Chief Complaint   Patient presents with    Headache     Pt presents ambulatory stating "last night I suddenly got a lot of pressure in my head and had trouble expressing myself. I am sensitive to light and was sitting under LED lights." Currently pt c/o headache. PT AAOx4 upon arrival and speaking clearly. Hx of aneurysm. Betty Albarran is a 30 y/o male with PMH cerebral aneurysm presenting today with complaint of a headache x 2 weeks and an episode of expressive aphasia and difficulty swallowing late night lasting an hour. He reports the headache 6/10, located at posterior right head and neck, nonradiating, described as a pressure sensation. Patient reports associated neck stiffness and photosensitivity consistent with previous repetitive headaches. Patient has taken no medications, and has only used topical lavender for neck/head pain, which has helped. He denies fevers/chills, slurred speech, ataxia, unilateral weakness, numbness/tingling, nausea/vomiting, vision changes, chest pain, SOB, sore throat, cough, congestion. He reports no history of aphasia or dysphagia, but has intermittently had headaches with photophobia and neck stiffness, sometimes occurring when sleeping, as well as left arm tingling sometimes when exercising. He denies ETOH/tobacco/drug use. Reports daily consuming "a lot of caffeine." Denies head trauma.       Headache  Associated symptoms: neck pain, neck stiffness and photophobia    Associated symptoms: no abdominal pain, no back pain, no congestion, no cough, no diarrhea, no ear pain, no fatigue, no fever, no myalgias, no nausea, no numbness, no seizures, no sinus pressure, no sore throat, no vomiting and no weakness        None       Past Medical History:   Diagnosis Date    Aneurysm ([address]) 2010    R sided       Past Surgical History:   Procedure Laterality Date    IR CEREBRAL ANGIOGRAPHY  9/11/2020       Family History   Problem Relation Age of Onset    Atrial fibrillation Mother I have reviewed and agree with the history as documented. E-Cigarette/Vaping    E-Cigarette Use Never User      E-Cigarette/Vaping Substances    Nicotine No     THC No     CBD No     Flavoring No     Other No     Unknown No      Social History     Tobacco Use    Smoking status: Former     Types: Cigarettes     Start date: 3/5/2020    Smokeless tobacco: Never   Vaping Use    Vaping Use: Never used   Substance Use Topics    Alcohol use: Not Currently     Comment: Socially    Drug use: Not Currently     Types: Marijuana       Review of Systems   Constitutional:  Negative for activity change, appetite change, chills, diaphoresis, fatigue and fever. HENT:  Positive for trouble swallowing. Negative for congestion, drooling, ear pain, sinus pressure, sore throat and voice change. Eyes:  Positive for photophobia. Negative for visual disturbance. Respiratory:  Negative for cough, chest tightness and shortness of breath. Cardiovascular:  Negative for chest pain and palpitations. Gastrointestinal:  Negative for abdominal pain, blood in stool, constipation, diarrhea, nausea and vomiting. Genitourinary:  Negative for decreased urine volume, dysuria, frequency and urgency. Musculoskeletal:  Positive for neck pain and neck stiffness. Negative for back pain, gait problem and myalgias. Skin:  Negative for color change, pallor and wound. Neurological:  Positive for speech difficulty ("couldn't say what I was trying to say") and headaches. Negative for seizures, syncope, weakness and numbness. Physical Exam  Physical Exam  Constitutional:       General: He is not in acute distress. Appearance: Normal appearance. He is normal weight. He is not ill-appearing, toxic-appearing or diaphoretic. HENT:      Head: Normocephalic and atraumatic. Nose: No congestion or rhinorrhea. Mouth/Throat:      Mouth: Mucous membranes are moist.   Eyes:      General: Vision grossly intact.       Extraocular Movements: Extraocular movements intact. Pupils: Pupils are equal, round, and reactive to light. Cardiovascular:      Rate and Rhythm: Normal rate and regular rhythm. Pulses: Normal pulses. Heart sounds: Normal heart sounds. Pulmonary:      Effort: Pulmonary effort is normal.      Breath sounds: Normal breath sounds. Abdominal:      General: Abdomen is flat. Bowel sounds are normal.      Palpations: Abdomen is soft. Tenderness: There is no abdominal tenderness. Skin:     General: Skin is warm and dry. Coloration: Skin is not jaundiced or pale. Neurological:      General: No focal deficit present. Mental Status: He is alert and oriented to person, place, and time. GCS: GCS eye subscore is 4. GCS verbal subscore is 5. GCS motor subscore is 6. Cranial Nerves: Cranial nerves 2-12 are intact. No cranial nerve deficit. Sensory: Sensation is intact. No sensory deficit. Motor: Motor function is intact. No weakness. Coordination: Coordination normal.      Gait: Gait is intact.  Gait normal.         Vital Signs  ED Triage Vitals   Temperature Pulse Respirations Blood Pressure SpO2   11/17/23 0935 11/17/23 0935 11/17/23 0935 11/17/23 0935 11/17/23 0935   98.2 °F (36.8 °C) 74 18 105/54 100 %      Temp Source Heart Rate Source Patient Position - Orthostatic VS BP Location FiO2 (%)   11/17/23 0935 11/17/23 0935 11/17/23 0935 11/17/23 0935 --   Oral Monitor Sitting Left arm       Pain Score       11/17/23 1015       6           Vitals:    11/17/23 1015 11/17/23 1030 11/17/23 1130 11/17/23 1230   BP: 97/56 94/56 101/55 92/61   Pulse: 67 68 70 67   Patient Position - Orthostatic VS:  Lying           Visual Acuity  Visual Acuity      Flowsheet Row Most Recent Value   L Pupil Size (mm) 3   R Pupil Size (mm) 3        OD 20/30  OS 20/30    ED Medications  Medications   acetaminophen (TYLENOL) tablet 650 mg (650 mg Oral Given 11/17/23 1017)   iohexol (OMNIPAQUE) 350 MG/ML injection (MULTI-DOSE) 100 mL (100 mL Intravenous Given 11/17/23 1058)       Diagnostic Studies  Results Reviewed       Procedure Component Value Units Date/Time    Basic metabolic panel [[id number]] Collected: 11/17/23 1021    Lab Status: Final result Specimen: Blood from Arm, Right Updated: 11/17/23 1046     Sodium 137 mmol/L      Potassium 4.1 mmol/L      Chloride 106 mmol/L      CO2 28 mmol/L      ANION GAP 3 mmol/L      BUN 9 mg/dL      Creatinine 0.90 mg/dL      Glucose 92 mg/dL      Calcium 9.1 mg/dL      eGFR 115 ml/min/1.73sq m     Narrative:      [facility] guidelines for Chronic Kidney Disease (CKD):     Stage 1 with normal or high GFR (GFR > 90 mL/min/1.73 square meters)    Stage 2 Mild CKD (GFR = 60-89 mL/min/1.73 square meters)    Stage 3A Moderate CKD (GFR = 45-59 mL/min/1.73 square meters)    Stage 3B Moderate CKD (GFR = 30-44 mL/min/1.73 square meters)    Stage 4 Severe CKD (GFR = 15-29 mL/min/1.73 square meters)    Stage 5 End Stage CKD (GFR <15 mL/min/1.73 square meters)  Note: GFR calculation is accurate only with a steady state creatinine    Protime-INR [[id number]]  (Normal) Collected: 11/17/23 1021    Lab Status: Final result Specimen: Blood from Arm, Right Updated: 11/17/23 1042     Protime 13.7 seconds      INR 0.99    APTT [[id number]]  (Normal) Collected: 11/17/23 1021    Lab Status: Final result Specimen: Blood from Arm, Right Updated: 11/17/23 1042     PTT 29 seconds     CBC and differential [[id number]]  (Abnormal) Collected: 11/17/23 1021    Lab Status: Final result Specimen: Blood from Arm, Right Updated: 11/17/23 1033     WBC 3.35 Thousand/uL      RBC 4.28 Million/uL      Hemoglobin 12.8 g/dL      Hematocrit 39.5 %      MCV 92 fL      MCH 29.9 pg      MCHC 32.4 g/dL      RDW 12.8 %      MPV 9.7 fL      Platelets 309 Thousands/uL      nRBC 0 /100 WBCs      Neutrophils Relative 36 %      Immat GRANS % 1 %      Lymphocytes Relative 45 %      Monocytes Relative 9 % Eosinophils Relative 7 %      Basophils Relative 2 %      Neutrophils Absolute 1.22 Thousands/µL      Immature Grans Absolute 0.02 Thousand/uL      Lymphocytes Absolute 1.52 Thousands/µL      Monocytes Absolute 0.31 Thousand/µL      Eosinophils Absolute 0.22 Thousand/µL      Basophils Absolute 0.06 Thousands/µL                    CTA head and neck with and without contrast   Final Result by Radha Clements MD (11/17 1201)      1. No acute intracranial abnormality. 2.  Patent major vessels of the [place] without stenosis. 3.  Stable fusiform enlargement of the right MCA distal M1 and M2 segments. 4.  Unremarkable CT angiogram of the neck. Workstation performed: [id number]                    ED Course  ED Course as of 11/17/23 1401   Fri Nov 17, 2023   1319 Aneurysm stable per CT. Patient has been stable and comfortable since tylenol. Patient safe for discharge home with f/u with neurology. SBIRT 20yo+      Flowsheet Row Most Recent Value   Initial Alcohol Screen: US AUDIT-C     1. How often do you have a drink containing alcohol? 0 Filed at: 11/17/2023 0937   2. How many drinks containing alcohol do you have on a typical day you are drinking? 0 Filed at: 11/17/2023 0937   3a. Male UNDER 65: How often do you have five or more drinks on one occasion? 0 Filed at: 11/17/2023 8455   Audit-C Score 0 Filed at: 11/17/2023 4194   NAV: How many times in the past year have you. .. Used an illegal drug or used a prescription medication for non-medical reasons? Never Filed at: 11/17/2023 0839            Medical Decision Making  Patient has been stable in the ER. Patient has requested only tylenol for pain relief. CT showed stable aneurysm of right MCA, compared to image in 2020, and no sign of acute ICH or mass. Patient needs to follow-up with Neuro regularly as an outpatient. Patient has not been seeing Neurosurgery as an OP due to insurance issues.  Patient's mother also voiced preference to not follow-up with previous neuro group, but not sure if preferred Neuro group is an option with insurance. Will discharge patient from ER today with Neuro consult, to schedule if willing. Amount and/or Complexity of Data Reviewed  Labs: ordered. Radiology: ordered. Risk  OTC drugs. Prescription drug management. Disposition  Final diagnoses:   Headache   Aneurysm of middle cerebral artery     Time reflects when diagnosis was documented in both MDM as applicable and the Disposition within this note       Time User Action Codes Description Comment    11/17/2023 12:54 PM Lucina Polo Add [R51.9] Headache     11/17/2023 12:56 PM Lucina Polo Add [I67.1] Aneurysm of middle cerebral artery           ED Disposition       ED Disposition   Discharge    Condition   Stable    Date/Time   Fri Nov 17, 2023 [address] discharge to home/self care. Follow-up Information       Follow up With Specialties Details Why Contact Info    Neurology  In 1 week              There are no discharge medications for this patient. No discharge procedures on file.     PDMP Review       None            ED Provider  Electronically Signed by             Kristen Kee PA-C  11/17/23 2472

## 2023-11-17 NOTE — DISCHARGE INSTRUCTIONS
Please follow-up with neurology to monitor aneurysm  Take tylenol/motrin as needed for pain relief. Please return to ER in the case of worsening headache, severe headache, fevers/chills, aphasia episode, weakness, numbness, difficulty walking/talking/swallowing.

## 2023-11-22 ENCOUNTER — TELEPHONE (OUTPATIENT)
Dept: NEUROSURGERY | Facility: CLINIC | Age: 28
End: 2023-11-22

## 2023-11-22 NOTE — TELEPHONE ENCOUNTER
11/22/23 RECEIVED MSG FROM [facility] THAT SHE WAS CONTACTED BY [facility] ED 11/17/23 TO MAKE F/U APPT FOR PT HX OF ANEURYSM. PT LAST SEEN 9/23/20 BY DR CAO FOR ANEURYSM.  CTA HEAD & NECK 11/17/23     CALLED PT PRIMARY NUMBER, PT MOM RADHA, AND LMOM FOR PT TO CB TO ENTER REFERRAL FROM ED (ONCE INS VERIFIED) AND GO OVER ENDO BRAIN INTAKE FOR REVIEW (SINCE MORE THAN 3 YEARS SINCE PT BEEN SEEN).

## 2023-11-28 NOTE — TELEPHONE ENCOUNTER
11/28/23 CALLED AND SPOKE TO PT MOM, RADHA, SHE STATES PT DOES NOT HAVE INS AT THIS TIME. THEY JUST SPOKE TO [facility] [employer] DEPARTMENT TODAY AND WILL BE COMPLETING PAPERWORK FOR POSSIBLE MEDICAL ASSISTANCE. I OFFERED TO FOLLOW UP AGAIN IN A WEEK OR 2 TO CHECK STATUS OF INS. SHE WAS APPRECIATIVE FOR CALL.

## 2023-12-13 ENCOUNTER — EVALUATION (OUTPATIENT)
Dept: PHYSICAL THERAPY | Facility: CLINIC | Age: 28
End: 2023-12-13
Payer: OTHER MISCELLANEOUS

## 2023-12-13 DIAGNOSIS — M25.561 CHRONIC PAIN OF RIGHT KNEE: Primary | ICD-10-CM

## 2023-12-13 DIAGNOSIS — G89.29 CHRONIC PAIN OF RIGHT KNEE: Primary | ICD-10-CM

## 2023-12-13 PROCEDURE — 97110 THERAPEUTIC EXERCISES: CPT | Performed by: PHYSICAL THERAPIST

## 2023-12-13 PROCEDURE — 97161 PT EVAL LOW COMPLEX 20 MIN: CPT | Performed by: PHYSICAL THERAPIST

## 2023-12-13 NOTE — PROGRESS NOTES
PT Evaluation     Today's date: 2023  Patient name: Hammad Valverde  : 1995  MRN: [id number]  Referring provider: Alexia Puga DO  Dx:   Encounter Diagnosis     ICD-10-CM    1. Chronic pain of right knee  M25.561     G89.29                      Assessment  Assessment details: Patient was provided a home exercise program and demonstrated an understanding of exercises. Patient was advised to stop performing home exercise program if symptoms increase or new complaints developed. Verbal understanding demonstrated regarding home exercise program instructions. Patient would benefit from skilled physical therapy services for prescribed exercises, manual interventions, neuromuscular re-education, education, and modalities as deemed appropriate to assist patient in achieving their maximum level of function. Patient presents 5 months post injury to right knee at work. He states that he was recently diagnosed with Medial meniscal tear. Evaluation reveals:  mild end range movement loss, weakness right le/ quad/ vmo, bilateral hs tightness, mild tenderness, fat pad hypertrophy due to PF tracking issues. Patient trialed kinesiotape today right knee with good response initially   ( monitor longer term response next session )     He presents as a good rehab candidate. Impairments: abnormal gait, abnormal or restricted ROM, activity intolerance, impaired physical strength, lacks appropriate home exercise program and pain with function  Understanding of Dx/Px/POC: good  Goals  STG   1. Patient will demonstrate independence and competence with HEP 2 -4 weeks  2. Patient will report > 50% reduced pain 2-4 weeks    LTG   1. Patient will report improvements with both functional and recreational abilities  4-6 weeks  2. Patient will demonstrate improved motor function  4-6 weeks. 3.  Restoration of full/ painfree right knee arom  4-8 weeks  4. Return to full function/ plans for rtw  4-8 weeks. Plan  Plan details: Patient response to treatment will be monitored each session and progressed accordingly    Thank you for this referral.   Patient would benefit from: skilled physical therapy  Planned modality interventions: cryotherapy  Planned therapy interventions: IADL retraining, manual therapy, patient education, postural training, strengthening, stretching, therapeutic exercise, flexibility, home exercise program, kinesiology taping and neuromuscular re-education  Frequency: 2x week  Plan of Care expiration date: 2024  Treatment plan discussed with: patient    Subjective Evaluation    History of Present Illness  Mechanism of injury: Working for [employer] on 23   something was falling and he went to save it by kicking it medially with his right foot -   he felt medial knee pain  Sought medical attention mid 2023    Given brace and instructed to start PT     He reports that he was procrastinating to start PT and had trouble getting a facility that will cover his [employer] workers comp. Pain is located medial and lateral joint line as well as medial/ lateral gastroc   Nighttime (-)  Starting to notice left knee pain due to compensation     Recently had MRI - (+) medial meniscal tear       OOW since July ( due to company shutdown )     Picked up a turtle tank a couple weeks ago which was heavy which may have aggravated his symptoms somewhat  - noticing that excessive walking is more irritating now. Patient does  not drive.      Patient Goals  Patient goals for therapy: increased strength, decreased pain, increased motion, independence with ADLs/IADLs, return to sport/leisure activities and return to work    Pain  Current pain ratin  At worst pain ratin  Quality: throbbing  Relieving factors: ice  Aggravating factors: walking  Progression: improved    Social Support  Lives with: parents (older brother)    Employment status: not working      Objective     Observations Additional Observation Details  R > L fat pad hypertrophy     Palpation     Right   Tenderness of the distal biceps femoris, distal semimembranosus and distal semitendinosus. Tenderness     Right Knee   Tenderness in the medial joint line. No tenderness in the inferior fat pad, lateral joint line, MCL (distal), MCL (proximal) and pes anserinus. Neurological Testing     Sensation     Knee   Left Knee   Intact: Light touch    Right Knee   Intact: light touch     Active Range of Motion   Left Knee   Normal active range of motion  Flexion: 145 degrees   Extension: 0 degrees     Right Knee   Flexion: 138 degrees with pain  Extension: -2 degrees     Passive Range of Motion     Right Knee   Flexion: 140 degrees with pain  Extension: 0 degrees with pain    Patellar Static Positioning     Additional Patellar Static Positioning Details  Patellae sit in slight inferior tilet     Strength/Myotome Testing     Left Knee   Normal strength  Quadriceps contraction: good    Right Knee   Flexion: 4+  Extension: 4+  Quadriceps contraction: fair    Tests     Right Knee   Negative anterior drawer, Apley's compression, lateral Sharmila, medial Sharmila, posterior drawer, valgus stress test at 0 degrees, valgus stress test at 30 degrees, varus stress test at 0 degrees and varus stress test at 30 degrees. General Comments:      Knee Comments  GAIT - slowed, slight decrease stance time right     Girth R sup patella 39  L = 39.0 cm           R inf patella  38  L = 37.5 cm            Precautions: Right knee pain      [email or web address]  Access Code: Nichol    POC expires Unit limit Auth Expiration date PT/OT/ST + Visit Limit?    2/13/24 bomn na 12                           Visit/Unit Tracking  AUTH Status:  Date 12/13              na Used 1               Remaining  11                      Manuals 12/13                         Kinesiotape PF Db  2 "I" strips                                      Neuro Re-Ed Qs, add set 3s x 20                                                                             Ther Ex             bike             saq             Slr  20x            Slr w/ er             HSS w/ strap 20s x 3            S/l abd 20x             Prone hs curls             Prone hip ext w/ knee flexed             Prone quad stretch w/ strap                           Stand slr                                                                                                                     Ther Activity                                       Gait Training                                       Modalities

## 2023-12-13 NOTE — LETTER
2023    Tom Dowd DO  [address]    Patient: Regan Garcia   YOB: 1995   Date of Visit: 2023     Encounter Diagnosis     ICD-10-CM    1. Chronic pain of right knee  M25.561     G89.29           Dear Dr. Eder Alas: Thank you for your recent referral of Regan Garcia. Please review the attached evaluation summary from Markus's recent visit. Please verify that you agree with the plan of care by signing the attached order. If you have any questions or concerns, please do not hesitate to call. I sincerely appreciate the opportunity to share in the care of one of your patients and hope to have another opportunity to work with you in the near future. Sincerely,    Devan Reed, PT      Referring Provider:      I certify that I have read the below Plan of Care and certify the need for these services furnished under this plan of treatment while under my care. Tom Dowd DO  [place]  Via Fax: [phone]          PT Evaluation     Today's date: 2023  Patient name: Regan Garcia  : 1995  MRN: [id number]  Referring provider: Tom Dowd DO  Dx:   Encounter Diagnosis     ICD-10-CM    1. Chronic pain of right knee  M25.561     G89.29                      Assessment  Assessment details: Patient was provided a home exercise program and demonstrated an understanding of exercises. Patient was advised to stop performing home exercise program if symptoms increase or new complaints developed. Verbal understanding demonstrated regarding home exercise program instructions. Patient would benefit from skilled physical therapy services for prescribed exercises, manual interventions, neuromuscular re-education, education, and modalities as deemed appropriate to assist patient in achieving their maximum level of function.     Patient presents 5 months post injury to right knee at work. He states that he was recently diagnosed with Medial meniscal tear. Evaluation reveals:  mild end range movement loss, weakness right le/ quad/ vmo, bilateral hs tightness, mild tenderness, fat pad hypertrophy due to PF tracking issues. Patient trialed kinesiotape today right knee with good response initially   ( monitor longer term response next session )     He presents as a good rehab candidate. Impairments: abnormal gait, abnormal or restricted ROM, activity intolerance, impaired physical strength, lacks appropriate home exercise program and pain with function  Understanding of Dx/Px/POC: good  Goals  STG   1. Patient will demonstrate independence and competence with HEP 2 -4 weeks  2. Patient will report > 50% reduced pain 2-4 weeks    LTG   1. Patient will report improvements with both functional and recreational abilities  4-6 weeks  2. Patient will demonstrate improved motor function  4-6 weeks. 3.  Restoration of full/ painfree right knee arom  4-8 weeks  4. Return to full function/ plans for rtw  4-8 weeks.      Plan  Plan details: Patient response to treatment will be monitored each session and progressed accordingly    Thank you for this referral.   Patient would benefit from: skilled physical therapy  Planned modality interventions: cryotherapy  Planned therapy interventions: IADL retraining, manual therapy, patient education, postural training, strengthening, stretching, therapeutic exercise, flexibility, home exercise program, kinesiology taping and neuromuscular re-education  Frequency: 2x week  Plan of Care expiration date: 2/13/2024  Treatment plan discussed with: patient    Subjective Evaluation    History of Present Illness  Mechanism of injury: Working for [employer] on 7-6-23   something was falling and he went to save it by kicking it medially with his right foot -   he felt medial knee pain  Sought medical attention mid August 2023    Given brace and instructed to start PT     He reports that he was procrastinating to start PT and had trouble getting a facility that will cover his [employer] [employer] workers comp. Pain is located medial and lateral joint line as well as medial/ lateral gastroc   Nighttime (-)  Starting to notice left knee pain due to compensation     Recently had MRI - (+) medial meniscal tear       OOW since July ( due to company shutdown )     Picked up a turtle tank a couple weeks ago which was heavy which may have aggravated his symptoms somewhat  - noticing that excessive walking is more irritating now. Patient does  not drive. Patient Goals  Patient goals for therapy: increased strength, decreased pain, increased motion, independence with ADLs/IADLs, return to sport/leisure activities and return to work    Pain  Current pain ratin  At worst pain ratin  Quality: throbbing  Relieving factors: ice  Aggravating factors: walking  Progression: improved    Social Support  Lives with: parents (older brother)    Employment status: not working      Objective     Observations     Additional Observation Details  R > L fat pad hypertrophy     Palpation     Right   Tenderness of the distal biceps femoris, distal semimembranosus and distal semitendinosus. Tenderness     Right Knee   Tenderness in the medial joint line. No tenderness in the inferior fat pad, lateral joint line, MCL (distal), MCL (proximal) and pes anserinus.      Neurological Testing     Sensation     Knee   Left Knee   Intact: Light touch    Right Knee   Intact: light touch     Active Range of Motion   Left Knee   Normal active range of motion  Flexion: 145 degrees   Extension: 0 degrees     Right Knee   Flexion: 138 degrees with pain  Extension: -2 degrees     Passive Range of Motion     Right Knee   Flexion: 140 degrees with pain  Extension: 0 degrees with pain    Patellar Static Positioning     Additional Patellar Static Positioning Details  Patellae sit in slight inferior tilet Strength/Myotome Testing     Left Knee   Normal strength  Quadriceps contraction: good    Right Knee   Flexion: 4+  Extension: 4+  Quadriceps contraction: fair    Tests     Right Knee   Negative anterior drawer, Apley's compression, lateral Sharmila, medial Sharmila, posterior drawer, valgus stress test at 0 degrees, valgus stress test at 30 degrees, varus stress test at 0 degrees and varus stress test at 30 degrees. General Comments:      Knee Comments  GAIT - slowed, slight decrease stance time right     Girth R sup patella 39  L = 39.0 cm           R inf patella  38  L = 37.5 cm            Precautions: Right knee pain      [email or web address]  Access Code: [email or web address]    POC expires Unit limit Auth Expiration date PT/OT/ST + Visit Limit?    2/13/24 bomn na 12                           Visit/Unit Tracking  AUTH Status:  Date 12/13              na Used 1               Remaining  11                      Manuals 12/13                         Kinesiotape PF Db  2 "I" strips                                      Neuro Re-Ed                          Qs, add set 3s x 20                                                                             Ther Ex             bike             saq             Slr  20x            Slr w/ er             HSS w/ strap 20s x 3            S/l abd 20x             Prone hs curls             Prone hip ext w/ knee flexed             Prone quad stretch w/ strap                           Stand slr                                                                                                                     Ther Activity                                       Gait Training                                       Modalities

## 2023-12-20 ENCOUNTER — OFFICE VISIT (OUTPATIENT)
Dept: PHYSICAL THERAPY | Facility: CLINIC | Age: 28
End: 2023-12-20
Payer: OTHER MISCELLANEOUS

## 2023-12-20 DIAGNOSIS — M25.561 CHRONIC PAIN OF RIGHT KNEE: Primary | ICD-10-CM

## 2023-12-20 DIAGNOSIS — G89.29 CHRONIC PAIN OF RIGHT KNEE: Primary | ICD-10-CM

## 2023-12-20 PROCEDURE — 97530 THERAPEUTIC ACTIVITIES: CPT

## 2023-12-20 PROCEDURE — 97110 THERAPEUTIC EXERCISES: CPT

## 2023-12-20 NOTE — PROGRESS NOTES
"Daily Note     Today's date: 2023  Patient name: Markus Espinal  : 1995  MRN: [id number]  Referring provider: Yazmin Swift DO  Dx:   Encounter Diagnosis     ICD-10-CM    1. Chronic pain of right knee  M25.561     G89.29                      Subjective: Patient reported improved stability with taping.  Patient denied pain.      Objective: See treatment diary below      Assessment: Tolerated treatment  and progressions without limitations In performance requiring vc's for technique with good response. . Patient demonstrated fatigue post treatment and would benefit from continued PT      Plan: Continue per plan of care.  Progress treatment as tolerated.       Precautions: Right knee pain      [email or web address]  Access Code: [email or web address]    POC expires Unit limit Auth Expiration date PT/OT/ST + Visit Limit?   24 bomn na 12                           Visit/Unit Tracking  AUTH Status:  Date              na Used 1 2              Remaining  11 10                     Manuals                         Kinesiotape PF Db  2 \"I\" strips 2 \"I\" strips  LA                                     Neuro Re-Ed                          Qs, add set 3s x 20 :03  20x                                                                            Ther Ex             bike  10'           saq  R  :03  20x           Slr  20x R  20x           Slr w/ er  R  20x           HSS w/ strap 20s x 3 R  :20  3x            S/l abd 20x  R  20x           Prone hs curls  R  20x           Prone hip ext w/ knee flexed  R  20x           Prone quad stretch w/ strap   R  :20  3x                        Stand slr                                                                                                                       Ther Activity                                       Gait Training                                       Modalities                                            "

## 2023-12-21 ENCOUNTER — OFFICE VISIT (OUTPATIENT)
Dept: PHYSICAL THERAPY | Facility: CLINIC | Age: 28
End: 2023-12-21
Payer: OTHER MISCELLANEOUS

## 2023-12-21 DIAGNOSIS — G89.29 CHRONIC PAIN OF RIGHT KNEE: Primary | ICD-10-CM

## 2023-12-21 DIAGNOSIS — M25.561 CHRONIC PAIN OF RIGHT KNEE: Primary | ICD-10-CM

## 2023-12-21 PROCEDURE — 97110 THERAPEUTIC EXERCISES: CPT | Performed by: PHYSICAL THERAPIST

## 2023-12-21 PROCEDURE — 97112 NEUROMUSCULAR REEDUCATION: CPT | Performed by: PHYSICAL THERAPIST

## 2023-12-21 NOTE — PROGRESS NOTES
"Daily Note     Today's date: 2023  Patient name: Markus Espinal  : 1995  MRN: [id number]  Referring provider: Yazmin Swift DO  Dx:   Encounter Diagnosis     ICD-10-CM    1. Chronic pain of right knee  M25.561     G89.29                      Subjective: Patient reports that his left knee is a bit sore today    reports feeling good post session       Objective: See treatment diary below      Assessment: Tolerated treatment well today -  added weight was challenging due to underlying weakness - but no discomfort produced.       Plan: Continue per plan of care.  Progress treatment as tolerated.       Precautions: Right knee pain      [email or web address]  Access Code: [email or web address]    POC expires Unit limit Auth Expiration date PT/OT/ST + Visit Limit?   24 bomn na 12                           Visit/Unit Tracking  AUTH Status:  Date             na Used 1 2 3             Remaining  11 10 9                    Manuals                        Kinesiotape PF Db  2 \"I\" strips 2 \"I\" strips  LA                                     Neuro Re-Ed                          Qs, add set 3s x 20 :03  20x 3s x 20                                                                            Ther Ex             bike  10' 5'          saq  R  :03  20x           Slr  20x R  20x 1#  x20 bilat          Slr w/ er  R  20x 1# x 20 bilat          HSS w/ strap 20s x 3 R  :20  3x  Bilat 20s x 3           S/l abd 20x  R  20x 1# x 20 bilat          Prone hs curls  R  20x 1# x 20 bilat          Prone hip ext w/ knee flexed  R  20x 1# x 20 bilat          Prone quad stretch w/ strap   R  :20  3x 20s x 3 bilat           Seated hip flexion/ laq   1# x 20 bilat          Stand slr abd, hs curls   1# x20 bilat          Squats @ bar   20x                                                                                                        Ther Activity                                       Gait Training      "                                  Modalities

## 2023-12-28 ENCOUNTER — OFFICE VISIT (OUTPATIENT)
Dept: PHYSICAL THERAPY | Facility: CLINIC | Age: 28
End: 2023-12-28
Payer: OTHER MISCELLANEOUS

## 2023-12-28 DIAGNOSIS — M25.561 CHRONIC PAIN OF RIGHT KNEE: Primary | ICD-10-CM

## 2023-12-28 DIAGNOSIS — G89.29 CHRONIC PAIN OF RIGHT KNEE: Primary | ICD-10-CM

## 2023-12-28 PROCEDURE — 97110 THERAPEUTIC EXERCISES: CPT | Performed by: PHYSICAL THERAPIST

## 2023-12-28 PROCEDURE — 97112 NEUROMUSCULAR REEDUCATION: CPT | Performed by: PHYSICAL THERAPIST

## 2023-12-28 NOTE — PROGRESS NOTES
"Daily Note     Today's date: 2023  Patient name: Markus Espinal  : 1995  MRN: [id number]  Referring provider: Yazmin Swift DO  Dx:   Encounter Diagnosis     ICD-10-CM    1. Chronic pain of right knee  M25.561     G89.29                      Subjective: Patient reports that he \"banged up his knee a little\"  notes that he did some pivoting on it while cooking and also that he had a dream and ended up kicking his knee in a funny way as he jumped up in bed.   7/10 VPS to start   Better after session  \" it doesn't feel as stiff\"     Objective: See treatment diary below      Assessment: Tolerated treatment well today -  exercises slowly and methodically       Plan: Continue per plan of care.  Progress treatment as tolerated.       Precautions: Right knee pain      [email or web address]  Access Code: [email or web address]    POC expires Unit limit Auth Expiration date PT/OT/ST + Visit Limit?   24 bomn na 12                           Visit/Unit Tracking  AUTH Status:  Date            na Used 1 2 3 4            Remaining  11 10 9 8                   Manuals                       Kinesiotape PF Db  2 \"I\" strips 2 \"I\" strips  LA                                     Neuro Re-Ed                          Qs, add set 3s x 20 :03  20x 3s x 20  3s x 20 ea                                                                          Ther Ex             bike  10' 5' 5'         saq  R  :03  20x  1# x 20 3s bilat         Slr  20x R  20x 1#  x20 bilat 1# x 20 bilat         Slr w/ er  R  20x 1# x 20 bilat 1# x 20 bilat         HSS w/ strap 20s x 3 R  :20  3x  Bilat 20s x 3  Bilat 20s x 3          S/l abd 20x  R  20x 1# x 20 bilat 1# x 20 bilat         Prone hs curls  R  20x 1# x 20 bilat 1# x 20 bilat         Prone hip ext w/ knee flexed  R  20x 1# x 20 bilat 1# x 20 bilat         Prone quad stretch w/ strap   R  :20  3x 20s x 3 bilat  HEP         Seated hip flexion/ laq   1# x 20 " bilat          Stand slr abd, hs curls, HR   1# x20 bilat 1# x 20 bilat          Squats @ bar   20x  20x                                                                                                       Ther Activity                                       Gait Training                                       Modalities

## 2024-01-03 ENCOUNTER — OFFICE VISIT (OUTPATIENT)
Dept: PHYSICAL THERAPY | Facility: CLINIC | Age: 29
End: 2024-01-03
Payer: OTHER MISCELLANEOUS

## 2024-01-03 DIAGNOSIS — M25.561 CHRONIC PAIN OF RIGHT KNEE: Primary | ICD-10-CM

## 2024-01-03 DIAGNOSIS — G89.29 CHRONIC PAIN OF RIGHT KNEE: Primary | ICD-10-CM

## 2024-01-03 PROCEDURE — 97112 NEUROMUSCULAR REEDUCATION: CPT | Performed by: PHYSICAL THERAPIST

## 2024-01-03 PROCEDURE — 97110 THERAPEUTIC EXERCISES: CPT | Performed by: PHYSICAL THERAPIST

## 2024-01-03 NOTE — PROGRESS NOTES
"Daily Note     Today's date: 1/3/2024  Patient name: Markus Espinal  : 1995  MRN: [id number]  Referring provider: Yazmin Swift DO  Dx:   Encounter Diagnosis     ICD-10-CM    1. Chronic pain of right knee  M25.561     G89.29                      Subjective: patient reports that he is being more consistent with his HEP and feels \"much better\"       Objective: See treatment diary below      Assessment: Tolerated treatment well       Plan: Continue per plan of care.  Progress treatment as tolerated.       Precautions: Right knee pain      [email or web address]  Access Code: [email or web address]    POC expires Unit limit Auth Expiration date PT/OT/ST + Visit Limit?   24 bomn na 12                           Visit/Unit Tracking  AUTH Status:  Date 12/13 12/20 12/21 12/ 28 1/3          na Used 1 2 3 4 5           Remaining  11 10 9 8 7                  Manuals 12/13 12/20 12/21 12/28 1/3                     Kinesiotape PF Db  2 \"I\" strips 2 \"I\" strips  LA                                     Neuro Re-Ed                          Qs, add set 3s x 20 :03  20x 3s x 20  3s x 20 ea Hep                                                                          Ther Ex             bike  10' 5' 5' 10'        saq  R  :03  20x  1# x 20 3s bilat 2# x 20 bilat         Slr  20x R  20x 1#  x20 bilat 1# x 20 bilat 2#  x20 bilat        Slr w/ er  R  20x 1# x 20 bilat 1# x 20 bilat 2# x 20 bilat        HSS w/ strap 20s x 3 R  :20  3x  Bilat 20s x 3  Bilat 20s x 3          S/l abd 20x  R  20x 1# x 20 bilat 1# x 20 bilat         Prone hs curls  R  20x 1# x 20 bilat 1# x 20 bilat 2# x 20 bilat         Prone hip ext w/ knee flexed  R  20x 1# x 20 bilat 1# x 20 bilat 2# x 20 bilat                     Seated hip flexion/ laq   1# x 20 bilat  1# x 20         Stand slr abd, hs curls, HR   1# x20 bilat 1# x 20 bilat  2# x 20 ea bilat        Squats @ bar   20x  20x  20x         STS Box     20\" x 20         TB TKE     Gtb 5s x 20 bilat          "                                                                   Ther Activity                                       Gait Training                                       Modalities

## 2024-01-04 ENCOUNTER — OFFICE VISIT (OUTPATIENT)
Dept: PHYSICAL THERAPY | Facility: CLINIC | Age: 29
End: 2024-01-04
Payer: OTHER MISCELLANEOUS

## 2024-01-04 DIAGNOSIS — G89.29 CHRONIC PAIN OF RIGHT KNEE: Primary | ICD-10-CM

## 2024-01-04 DIAGNOSIS — M25.561 CHRONIC PAIN OF RIGHT KNEE: Primary | ICD-10-CM

## 2024-01-04 PROCEDURE — 97110 THERAPEUTIC EXERCISES: CPT

## 2024-01-04 PROCEDURE — 97112 NEUROMUSCULAR REEDUCATION: CPT

## 2024-01-04 NOTE — PROGRESS NOTES
"Daily Note     Today's date: 2024  Patient name: Markus Espinal  : 1995  MRN: [id number]  Referring provider: Yazmin Swift DO  Dx:   Encounter Diagnosis     ICD-10-CM    1. Chronic pain of right knee  M25.561     G89.29                      Subjective: Upon presentation, patient's chief complaint is B quadriceps \"soreness, after yesterday's workout\".      Objective: See treatment diary below      Assessment: Tolerated treatment  very well without limitations or restrictions in TE performance today.  Patient does appear challenged and demonstrates B LE weakness. Note gradual gains toward therapy goals . Patient demonstrated fatigue post treatment, exhibited good technique with therapeutic exercises, and would benefit from continued PT      Plan: Continue per plan of care.  Progress treatment as tolerated.       Precautions: Right knee pain      [email or web address]  Access Code: [email or web address]    POC expires Unit limit Auth Expiration date PT/OT/ST + Visit Limit?   24 bomn na 12                           Visit/Unit Tracking  AUTH Status:  Date 12/13 12/20 12/21 12/ 28 1/3 1/4         na Used 1 2 3 4 5 6  FOTO          Remaining  11 10 9 8 7 6                 Manuals 12/13 12/20 12/21 12/28 1/3 1/4                    Kinesiotape PF Db  2 \"I\" strips 2 \"I\" strips  LA                                     Neuro Re-Ed                          Qs, add set 3s x 20 :03  20x 3s x 20  3s x 20 ea Hep                                                                          Ther Ex             bike  10' 5' 5' 10' 10'       saq  R  :03  20x  1# x 20 3s bilat 2# x 20 bilat  B  2#  20x         Slr  20x R  20x 1#  x20 bilat 1# x 20 bilat 2#  x20 bilat B  2#  20x       Slr w/ er  R  20x 1# x 20 bilat 1# x 20 bilat 2# x 20 bilat B  2#  20x       HSS w/ strap 20s x 3 R  :20  3x  Bilat 20s x 3  Bilat 20s x 3   HEP       S/l abd 20x  R  20x 1# x 20 bilat 1# x 20 bilat  B  2#  20x       Prone hs curls  R  20x 1# x 20 " "bilat 1# x 20 bilat 2# x 20 bilat  B  2#  20x       Prone hip ext w/ knee flexed  R  20x 1# x 20 bilat 1# x 20 bilat 2# x 20 bilat B  2#  20x                    Seated hip flexion/ laq   1# x 20 bilat  1# x 20  B  2#  20x  each       Stand slr abd, hs curls, HR   1# x20 bilat 1# x 20 bilat  2# x 20 ea bilat & hip ext  B  2#  20x each       Squats @ bar   20x  20x  20x  20x       STS Box     20\" x 20  20\"  15x2       TB TKE     Gtb 5s x 20 bilat  GTB  :05  20x                                                                           Ther Activity                                       Gait Training                                       Modalities                                              "

## 2024-01-10 ENCOUNTER — OFFICE VISIT (OUTPATIENT)
Dept: PHYSICAL THERAPY | Facility: CLINIC | Age: 29
End: 2024-01-10
Payer: OTHER MISCELLANEOUS

## 2024-01-10 DIAGNOSIS — G89.29 CHRONIC PAIN OF RIGHT KNEE: Primary | ICD-10-CM

## 2024-01-10 DIAGNOSIS — M25.561 CHRONIC PAIN OF RIGHT KNEE: Primary | ICD-10-CM

## 2024-01-10 PROCEDURE — 97110 THERAPEUTIC EXERCISES: CPT

## 2024-01-10 PROCEDURE — 97112 NEUROMUSCULAR REEDUCATION: CPT

## 2024-01-10 NOTE — PROGRESS NOTES
"Daily Note     Today's date: 1/10/2024  Patient name: Markus Espinal  : 1995  MRN: [id number]  Referring provider: Yazmin Swift DO  Dx:   Encounter Diagnosis     ICD-10-CM    1. Chronic pain of right knee  M25.561     G89.29                      Subjective: Upon presentation, patient noted \"pain\" 5-6 hours after last visit, noting increased discomfort with extended seated posturing and going down steps.  \"I know for a fact, I hve more strength\". Patient reported increase ease with HEP performance, to the point of performing HEP 2x/day now!      Objective: See treatment diary below      Assessment: Tolerated treatment  and slight progression of resistance without restriction or reported exacerbation of discomfort . Patient demonstrated fatigue post treatment, exhibited good technique with therapeutic exercises, and would benefit from continued PT      Plan: Continue per plan of care.      Precautions: Right knee pain      [email or web address]  Access Code: [email or web address]    POC expires Unit limit Auth Expiration date PT/OT/ST + Visit Limit?   24 bomn na 12                           Visit/Unit Tracking  AUTH Status:  Date 12/13 12/20 12/21 12/ 28 1/3 1/4 1/10        na Used 1 2 3 4 5 6  FOTO 7         Remaining  11 10 9 8 7 6 5                Manuals 12/13 12/20 12/21 12/28 1/3 1/4 1/10                   Kinesiotape PF Db  2 \"I\" strips 2 \"I\" strips  LA                                     Neuro Re-Ed                          Qs, add set 3s x 20 :03  20x 3s x 20  3s x 20 ea Hep                                                                          Ther Ex             bike  10' 5' 5' 10' 10' 10'      saq  R  :03  20x  1# x 20 3s bilat 2# x 20 bilat  B  2#  20x   B   2.5#  20x each      Slr  20x R  20x 1#  x20 bilat 1# x 20 bilat 2#  x20 bilat B  2#  20x B   2.5#  20x       Slr w/ er  R  20x 1# x 20 bilat 1# x 20 bilat 2# x 20 bilat B  2#  20x B  2.5#  20x       HSS w/ strap 20s x 3 R  :20  3x  Bilat " "20s x 3  Bilat 20s x 3   HEP       S/l abd 20x  R  20x 1# x 20 bilat 1# x 20 bilat  B  2#  20x B   2.5#  20x      Prone hs curls  R  20x 1# x 20 bilat 1# x 20 bilat 2# x 20 bilat  B  2#  20x B  2.5#  20x       Prone hip ext w/ knee flexed  R  20x 1# x 20 bilat 1# x 20 bilat 2# x 20 bilat B  2#  20x B  2.5#  20x                   Seated hip flexion/ laq   1# x 20 bilat  1# x 20  B  2#  20x  each B  2.5#  20x each      Stand slr abd, hs curls, HR   1# x20 bilat 1# x 20 bilat  2# x 20 ea bilat & hip ext  B  2#  20x each B  & hip ext  2.5#  20x each        Squats @ bar   20x  20x  20x  20x 20x      STS Box     20\" x 20  20\"  15x2 20\" box  15x2      TB TKE     Gtb 5s x 20 bilat  GTB  :05  20x        Step downs         B  8\"   10x                                                          Ther Activity                                       Gait Training                                       Modalities                                                "

## 2024-01-11 ENCOUNTER — OFFICE VISIT (OUTPATIENT)
Dept: PHYSICAL THERAPY | Facility: CLINIC | Age: 29
End: 2024-01-11
Payer: OTHER MISCELLANEOUS

## 2024-01-11 DIAGNOSIS — M25.561 CHRONIC PAIN OF RIGHT KNEE: Primary | ICD-10-CM

## 2024-01-11 DIAGNOSIS — G89.29 CHRONIC PAIN OF RIGHT KNEE: Primary | ICD-10-CM

## 2024-01-11 PROCEDURE — 97110 THERAPEUTIC EXERCISES: CPT | Performed by: PHYSICAL THERAPIST

## 2024-01-11 PROCEDURE — 97112 NEUROMUSCULAR REEDUCATION: CPT | Performed by: PHYSICAL THERAPIST

## 2024-01-11 NOTE — PROGRESS NOTES
"Daily Note     Today's date: 2024  Patient name: Markus Espinal  : 1995  MRN: [id number]  Referring provider: Yazmin Swift DO  Dx:   Encounter Diagnosis     ICD-10-CM    1. Chronic pain of right knee  M25.561     G89.29                      Subjective: patient reports that he feels better today after using increased weight yesterday for pres'    Objective: See treatment diary below      Assessment: Tolerated treatment well overall         Plan: Continue per plan of care.      Precautions: Right knee pain      [email or web address]  Access Code: [email or web address]    POC expires Unit limit Auth Expiration date PT/OT/ST + Visit Limit?   24 bomn na 12                           Visit/Unit Tracking  AUTH Status:  Date 12/13 12/20 12/21 12/ 28 1/3 1/4 1/10 1/11       na Used 1 2 3 4 5 6  FOTO 7 8        Remaining  11 10 9 8 7 6 5 4               Manuals 12/13 12/20 12/21 12/28 1/3 1/4 1/10 1/11                  Kinesiotape PF Db  2 \"I\" strips 2 \"I\" strips  LA                                     Neuro Re-Ed                                                                                                        Ther Ex             bike  10' 5' 5' 10' 10' 10' 10'      saq  R  :03  20x  1# x 20 3s bilat 2# x 20 bilat  B  2#  20x   B   2.5#  20x each 2.5# x 20 ea     Slr  20x R  20x 1#  x20 bilat 1# x 20 bilat 2#  x20 bilat B  2#  20x B   2.5#  20x  2.5# x 20 ea     Slr w/ er  R  20x 1# x 20 bilat 1# x 20 bilat 2# x 20 bilat B  2#  20x B  2.5#  20x  2.5# x 20 ea                  S/l abd 20x  R  20x 1# x 20 bilat 1# x 20 bilat  B  2#  20x B   2.5#  20x 2.5# x 20      Prone hs curls  R  20x 1# x 20 bilat 1# x 20 bilat 2# x 20 bilat  B  2#  20x B  2.5#  20x  2.5# x 20      Prone hip ext w/ knee flexed  R  20x 1# x 20 bilat 1# x 20 bilat 2# x 20 bilat B  2#  20x B  2.5#  20x 2.5# x 20                   Seated hip flexion/ laq   1# x 20 bilat  1# x 20  B  2#  20x  each B  2.5#  20x each 2.5# x 20 ea      Stand slr " "abd, hs curls, HR   1# x20 bilat 1# x 20 bilat  2# x 20 ea bilat & hip ext  B  2#  20x each B  & hip ext  2.5#  20x each   2.5# x 20 ea      Squats @ bar   20x  20x  20x  20x 20x 20x      STS Box     20\" x 20  20\"  15x2 20\" box  15x2      TB TKE     Gtb 5s x 20 bilat  GTB  :05  20x        Step downs         B  8\"   10x B 8\" x 10 x 2      Step ups  f/l              Tramp toss bilat/ slb         Firm x 20 ea                                                                                    Ther Activity                                       Gait Training                                       Modalities                                                "

## 2024-01-17 ENCOUNTER — OFFICE VISIT (OUTPATIENT)
Dept: PHYSICAL THERAPY | Facility: CLINIC | Age: 29
End: 2024-01-17
Payer: OTHER MISCELLANEOUS

## 2024-01-17 DIAGNOSIS — G89.29 CHRONIC PAIN OF RIGHT KNEE: Primary | ICD-10-CM

## 2024-01-17 DIAGNOSIS — M25.561 CHRONIC PAIN OF RIGHT KNEE: Primary | ICD-10-CM

## 2024-01-17 PROCEDURE — 97112 NEUROMUSCULAR REEDUCATION: CPT

## 2024-01-17 PROCEDURE — 97110 THERAPEUTIC EXERCISES: CPT

## 2024-01-17 NOTE — PROGRESS NOTES
"Daily Note     Today's date: 2024  Patient name: Markus Espinal  : 1995  MRN: [id number]  Referring provider: Yazmin Swift DO  Dx:   Encounter Diagnosis     ICD-10-CM    1. Chronic pain of right knee  M25.561     G89.29                      Subjective: Patient stated he appreciates the challenge in therapy and noted gradual improvement in strength and stability of right LE.      Objective: See treatment diary below      Assessment: Tolerated treatment  continuing to demonstrate poor eccentric quad control specifically with step downs/leg lowering . Patient demonstrated fatigue post treatment, exhibited good technique with therapeutic exercises, and would benefit from continued PT to improve LE strength and function.      Plan: Continue per plan of care.  Progress treatment as tolerated.       Precautions: Right knee pain      [email or web address]  Access Code: [email or web address]    POC expires Unit limit Auth Expiration date PT/OT/ST + Visit Limit?   24 bomn na 12                           Visit/Unit Tracking  AUTH Status:  Date 12/13 12/20 12/21 12/ 28 1/3 1/4 1/10 1/11 1/17      na Used 1 2 3 4 5 6  FOTO 7 8 9       Remaining  11 10 9 8 7 6 5 4 3              Manuals 12/13 12/20 12/21 12/28 1/3 1/4 1/10 1/11 1/17                 Kinesiotape PF Db  2 \"I\" strips 2 \"I\" strips  LA                                     Neuro Re-Ed             Lateral \"X\"  walk with T-band          NV                                                                                  Ther Ex             bike  10' 5' 5' 10' 10' 10' 10'  10'  L6    saq  R  :03  20x  1# x 20 3s bilat 2# x 20 bilat  B  2#  20x   B   2.5#  20x each 2.5# x 20 ea     Slr  20x R  20x 1#  x20 bilat 1# x 20 bilat 2#  x20 bilat B  2#  20x B   2.5#  20x  2.5# x 20 ea B  3#  20x    Slr w/ er  R  20x 1# x 20 bilat 1# x 20 bilat 2# x 20 bilat B  2#  20x B  2.5#  20x  2.5# x 20 ea B  3#  20x each                 S/l abd 20x  R  20x 1# x 20 bilat 1# x 20 " "bilat  B  2#  20x B   2.5#  20x 2.5# x 20      Prone hs curls  R  20x 1# x 20 bilat 1# x 20 bilat 2# x 20 bilat  B  2#  20x B  2.5#  20x  2.5# x 20      Prone hip ext w/ knee flexed  R  20x 1# x 20 bilat 1# x 20 bilat 2# x 20 bilat B  2#  20x B  2.5#  20x 2.5# x 20                   Seated hip flexion/ laq   1# x 20 bilat  1# x 20  B  2#  20x  each B  2.5#  20x each 2.5# x 20 ea  B  3#  20x each    Stand slr abd, hs curls, HR   1# x20 bilat 1# x 20 bilat  2# x 20 ea bilat & hip ext  B  2#  20x each B  & hip ext  2.5#  20x each   2.5# x 20 ea  B  3#  20x each    Squats @ bar   20x  20x  20x  20x 20x 20x  20x    STS Box     20\" x 20  20\"  15x2 20\" box  15x2  10lb DB  20\"  20x    TB TKE     Gtb 5s x 20 bilat  GTB  :05  20x        Step downs         B  8\"   10x B 8\" x 10 x 2  B  8\"  3#  10x2    Step ups  f/l          R 8\"  3#  20x each    Tramp toss bilat/ slb         Firm x 20 ea  R SLB  Firm  20x each  BMB                                                                                  Ther Activity                                       Gait Training                                       Modalities                                                  "

## 2024-01-18 ENCOUNTER — OFFICE VISIT (OUTPATIENT)
Dept: PHYSICAL THERAPY | Facility: CLINIC | Age: 29
End: 2024-01-18
Payer: OTHER MISCELLANEOUS

## 2024-01-18 DIAGNOSIS — M25.561 CHRONIC PAIN OF RIGHT KNEE: Primary | ICD-10-CM

## 2024-01-18 DIAGNOSIS — G89.29 CHRONIC PAIN OF RIGHT KNEE: Primary | ICD-10-CM

## 2024-01-18 PROCEDURE — 97110 THERAPEUTIC EXERCISES: CPT | Performed by: PHYSICAL THERAPIST

## 2024-01-18 PROCEDURE — 97112 NEUROMUSCULAR REEDUCATION: CPT | Performed by: PHYSICAL THERAPIST

## 2024-01-18 NOTE — PROGRESS NOTES
"Daily Note     Today's date: 2024  Patient name: Markus Espinal  : 1995  MRN: [id number]  Referring provider: Yazmin Swift DO  Dx:   Encounter Diagnosis     ICD-10-CM    1. Chronic pain of right knee  M25.561     G89.29                      Subjective: Patient reports that he feels good, pleased with his progress - feeling stronger      Objective: See treatment diary below      Assessment: Tolerated treatment well overall - increases met with fatigue.     Plan: Continue per plan of care.  Progress treatment as tolerated.       Precautions: Right knee pain      [email or web address]  Access Code: [email or web address]    POC expires Unit limit Auth Expiration date PT/OT/ST + Visit Limit?   24 bomn na 12                           Visit/Unit Tracking  AUTH Status:  Date 12/13 12/20 12/21 12/ 28 1/3 1/4 1/10 1/11 1/17 1/18     na Used 1 2 3 4 5 6  FOTO 7 8 9 10      Remaining  11 10 9 8 7 6 5 4 3 2             Manuals 12/13 12/20 12/21 12/28 1/3 1/4 1/10 1/11 1/17 1/18                Kinesiotape PF Db  2 \"I\" strips 2 \"I\" strips  LA                                     Neuro Re-Ed             Lateral \"X\"  walk with T-band          NV 1 \"X\"  16' x 3  GTB   Toe walk fwd/ retro          30' x 3                                                                     Ther Ex             bike  10' 5' 5' 10' 10' 10' 10'  10'  L6 10'                Squats @ bar   20x  20x  20x  20x 20x 20x  20x    STS Box     20\" x 20  20\"  15x2 20\" box  15x2  10lb DB  20\"  20x 10# 20\" x 20                 Step downs         B  8\"   10x B 8\" x 10 x 2  B  8\"  3#  10x2    Step ups  f/l          R 8\"  3#  20x each Foam 20x ea   8\"   Tramp toss bilat/ slb         Firm x 20 ea  R SLB  Firm  20x each  BMB RMB firm SLB bilat x 20    CHRIS walk outs           17# x 5 ea    LEG press          105# 10 x 2   CALF press           105# 10 x 2                                                                                                         "   Ther Activity                                       Gait Training                                       Modalities

## 2024-01-24 ENCOUNTER — OFFICE VISIT (OUTPATIENT)
Dept: PHYSICAL THERAPY | Facility: CLINIC | Age: 29
End: 2024-01-24
Payer: OTHER MISCELLANEOUS

## 2024-01-24 DIAGNOSIS — M25.561 CHRONIC PAIN OF RIGHT KNEE: Primary | ICD-10-CM

## 2024-01-24 DIAGNOSIS — G89.29 CHRONIC PAIN OF RIGHT KNEE: Primary | ICD-10-CM

## 2024-01-24 PROCEDURE — 97110 THERAPEUTIC EXERCISES: CPT

## 2024-01-24 PROCEDURE — 97112 NEUROMUSCULAR REEDUCATION: CPT

## 2024-01-24 NOTE — PROGRESS NOTES
"Daily Note     Today's date: 2024  Patient name: Markus Espinal  : 1995  MRN: [id number]  Referring provider: Yazmin Swift DO  Dx:   Encounter Diagnosis     ICD-10-CM    1. Chronic pain of right knee  M25.561     G89.29                      Subjective: Patient reported trying to perform single leg squats at home and experienced knee pain.        Objective: See treatment diary below      Assessment: Tolerated treatment  with vc's for technique and pacing . Patient was encouraged to work to tolerance to avoid aggravation of pain symptoms.  Discussed benefits of gradual progressions to determine ability to tolerance more resistive activities to prevent injury and/or setback. Patient demonstrated fatigue pre/post treatment and would benefit from continued PT      Plan: Continue per plan of care.      Precautions: Right knee pain      [email or web address]  Access Code: [email or web address]    POC expires Unit limit Auth Expiration date PT/OT/ST + Visit Limit?   24 bomn na 12                           Visit/Unit Tracking  AUTH Status:  Date 12/13 12/20 12/21 12/ 28 1/3 1/4 1/10 1/11 1/17 1/18 1/24    na Used 1 2 3 4 5 6  FOTO 7 8 9 10 11     Remaining  11 10 9 8 7 6 5 4 3 2 3            Manuals 1/24   12/28 1/3 1/4 1/10 1/11 1/17 1/18                Kinesiotape PF                                       Neuro Re-Ed             Lateral \"X\"  walk with T-band  GTB  20'x3   BTB       NV 1 \"X\"  16' x 3  GTB   Toe walk fwd/ retro 30'x3  5 lb DB each UE         30' x 3                                                                     Ther Ex             bike 10'  L3   5' 10' 10' 10' 10'  10'  L6 10'                Squats @ bar Held   20x  20x  20x 20x 20x  20x    STS Box 10#  20\" box  6x   Knee p!    20\" x 20  20\"  15x2 20\" box  15x2  10lb DB  20\"  20x 10# 20\" x 20                 Step downs Held        B  8\"   10x B 8\" x 10 x 2  B  8\"  3#  10x2    Step ups  f/l  Foam   5 # DB each UE  8\"  20x each        R " "8\"  3#  20x each Foam 20x ea   8\"   Tramp toss bilat/ slb  RMB foam  SLB  20x       Firm x 20 ea  R SLB  Firm  20x each  BMB RMB firm SLB bilat x 20    CHRIS walk outs  18 lbs  5 laps each         17# x 5 ea    LEG press 105#  10x2         105# 10 x 2   CALF press  105#  10x2         105# 10 x 2                                                                                                           Ther Activity                                       Gait Training                                       Modalities                                                    "

## 2024-01-25 ENCOUNTER — EVALUATION (OUTPATIENT)
Dept: PHYSICAL THERAPY | Facility: CLINIC | Age: 29
End: 2024-01-25
Payer: OTHER MISCELLANEOUS

## 2024-01-25 DIAGNOSIS — M25.561 CHRONIC PAIN OF RIGHT KNEE: Primary | ICD-10-CM

## 2024-01-25 DIAGNOSIS — G89.29 CHRONIC PAIN OF RIGHT KNEE: Primary | ICD-10-CM

## 2024-01-25 PROCEDURE — 97110 THERAPEUTIC EXERCISES: CPT | Performed by: PHYSICAL THERAPIST

## 2024-01-25 PROCEDURE — 97112 NEUROMUSCULAR REEDUCATION: CPT | Performed by: PHYSICAL THERAPIST

## 2024-01-25 NOTE — PROGRESS NOTES
PT Re-Evaluation     Today's date: 2024  Patient name: Markus Espinal  : 1995  MRN: [id number]  Referring provider: Yazmin Swift DO  Dx:   Encounter Diagnosis     ICD-10-CM    1. Chronic pain of right knee  M25.561     G89.29                      Assessment  Assessment details: Patient is doing well in PT thus far.   He has made nice gains towards original goals.      Strength remains his greatest deficit    he feels that overall little change in pain levels - some days good, others not.     He is independent and competent with HEP to date    He would benefit from continued PT to progress strengthening/ dynamic tasks          Impairments: activity intolerance, impaired physical strength and pain with function  Understanding of Dx/Px/POC: good  Goals  STG   1.  Patient will demonstrate independence and competence with HEP 2 -4 weeks  MET to date  2.  Patient will report > 50% reduced pain 2-4 weeks  partially met    LTG   1.  Patient will report improvements with both functional and recreational abilities  4-6 weeks ONGOING  2.  Patient will demonstrate improved motor function  4-6 weeks.   Partially MET  3.  Restoration of full/ painfree right knee arom  4-8 weeks  MET  4.  Return to full function/ plans for rtw  4-8 weeks.   NOT met    Plan  Patient would benefit from: skilled physical therapy  Planned modality interventions: cryotherapy  Planned therapy interventions: IADL retraining, manual therapy, patient education, postural training, strengthening, stretching, therapeutic exercise, flexibility, home exercise program, kinesiology taping and neuromuscular re-education  Frequency: 2x week  Plan of Care expiration date: 3/25/2024  Treatment plan discussed with: patient    Subjective Evaluation    Patient Goals  Patient goals for therapy: increased strength, decreased pain, increased motion, independence with ADLs/IADLs, return to sport/leisure activities and return to work    Pain  Current pain  rating: 3  At worst pain ratin  Quality: dull ache  Relieving factors: ice  Aggravating factors: walking  Progression: improved    Social Support  Lives with: parents (older brother)    Employment status: not working      Objective     Observations     Additional Observation Details  R > L fat pad hypertrophy     Palpation     Right   No palpable tenderness to the distal biceps femoris, distal semimembranosus and distal semitendinosus.     Tenderness     Right Knee   Tenderness in the medial joint line. No tenderness in the inferior fat pad, lateral joint line, MCL (distal), MCL (proximal) and pes anserinus.     Neurological Testing     Sensation     Knee   Left Knee   Intact: Light touch    Right Knee   Intact: light touch     Active Range of Motion   Left Knee   Normal active range of motion  Flexion: 145 degrees   Extension: 0 degrees     Right Knee   Flexion: 144 degrees   Extension: 0 degrees     Passive Range of Motion     Right Knee   Flexion: WFL  Extension: 0 degrees     Strength/Myotome Testing     Left Knee   Normal strength  Quadriceps contraction: good    Right Knee   Flexion: 4+  Extension: 4+  Quadriceps contraction: fair    Tests     Right Knee   Negative anterior drawer, Apley's compression, lateral Sharmila, medial Sharmila, posterior drawer, valgus stress test at 0 degrees, valgus stress test at 30 degrees, varus stress test at 0 degrees and varus stress test at 30 degrees.     General Comments:      Knee Comments  GAIT - slowed, heel - toe , equal wb            Precautions: Right knee pain      [email or web address]  Access Code: [email or web address]    POC expires Unit limit Auth Expiration date PT/OT/ST + Visit Limit?   24 bomn na 12                           Visit/Unit Tracking  AUTH Status:  Date 12/13 12/20 12/21 12/ 28 1/3 1/4 1/10 1/11 1/17 1/18 1/24 1/25   na Used 1 2 3 4 5 6  FOTO 7 8 9 10 11 12- foto    Remaining  11 10 9 8 7 6 5 4 3 2 1 0           Manuals 1/24 1/25  12/28 1/3 1/4  "1/10 1/11 1/17 1/18                Kinesiotape PF                                       Neuro Re-Ed             Lateral \"X\"  walk with T-band  GTB  20'x3   BTB 20'x 3        NV 1 \"X\"  16' x 3  GTB   Toe walk fwd/ retro 30'x3  5 lb DB each UE 30' x 3 5# ea hand        30' x 3                                                                     Ther Ex             bike 10'  L3 10' L 3   5' 10' 10' 10' 10'  10'  L6 10'                Squats @ bar Held   20x  20x  20x 20x 20x  20x    STS Box 10#  20\" box  6x   Knee p! 10# 20\" x 20   20\" x 20  20\"  15x2 20\" box  15x2  10lb DB  20\"  20x 10# 20\" x 20                 Step downs Held        B  8\"   10x B 8\" x 10 x 2  B  8\"  3#  10x2    Step ups  f/l  Foam   5 # DB each UE  8\"  20x each Foam 5# 8\" x 20 ea bilat       R 8\"  3#  20x each Foam 20x ea   8\"   Tramp toss bilat/ slb  RMB foam  SLB  20x RMB foam slb x 20 bilat      Firm x 20 ea  R SLB  Firm  20x each  BMB RMB firm SLB bilat x 20    CHRIS walk outs  18 lbs  5 laps each 19# x 5 ea         17# x 5 ea    LEG press 105#  10x2 125#  10 x 2        105# 10 x 2   CALF press  105#  10x2 125#  10 x 2         105# 10 x 2                                                                                                           Ther Activity                                       Gait Training                                       Modalities                                                      "

## 2024-01-31 ENCOUNTER — APPOINTMENT (OUTPATIENT)
Dept: PHYSICAL THERAPY | Facility: CLINIC | Age: 29
End: 2024-01-31
Payer: OTHER MISCELLANEOUS

## 2024-09-20 ENCOUNTER — APPOINTMENT (OUTPATIENT)
Dept: RADIOLOGY | Facility: CLINIC | Age: 29
End: 2024-09-20
Payer: COMMERCIAL

## 2024-09-20 ENCOUNTER — OFFICE VISIT (OUTPATIENT)
Dept: OBGYN CLINIC | Facility: CLINIC | Age: 29
End: 2024-09-20
Payer: COMMERCIAL

## 2024-09-20 VITALS
HEART RATE: 78 BPM | WEIGHT: 168.4 LBS | BODY MASS INDEX: 21.61 KG/M2 | SYSTOLIC BLOOD PRESSURE: 145 MMHG | HEIGHT: 74 IN | DIASTOLIC BLOOD PRESSURE: 76 MMHG

## 2024-09-20 DIAGNOSIS — G89.29 CHRONIC PAIN OF RIGHT KNEE: ICD-10-CM

## 2024-09-20 DIAGNOSIS — M23.91 INTERNAL DERANGEMENT OF RIGHT KNEE: ICD-10-CM

## 2024-09-20 DIAGNOSIS — M25.561 CHRONIC PAIN OF RIGHT KNEE: Primary | ICD-10-CM

## 2024-09-20 DIAGNOSIS — M25.561 CHRONIC PAIN OF RIGHT KNEE: ICD-10-CM

## 2024-09-20 DIAGNOSIS — G89.29 CHRONIC PAIN OF RIGHT KNEE: Primary | ICD-10-CM

## 2024-09-20 PROCEDURE — 73564 X-RAY EXAM KNEE 4 OR MORE: CPT

## 2024-09-20 PROCEDURE — 99204 OFFICE O/P NEW MOD 45 MIN: CPT | Performed by: ORTHOPAEDIC SURGERY

## 2024-09-20 NOTE — PROGRESS NOTES
Patient Name:  Markus Espinal  MRN:  [id number]    Assessment & Plan     1. Chronic pain of right knee  -     XR knee 4+ vw right injury; Future; Expected date: 09/20/2024  -     MRI knee right  wo contrast; Future; Expected date: 09/20/2024  2. Internal derangement of right knee  -     MRI knee right  wo contrast; Future; Expected date: 09/20/2024      Chronic right knee pain, concern for medial meniscus tear  X-rays reviewed in office today with patient  Due to persistent pain and instability despite activity modification, home exercise program, physical therapy, along with normal x-rays and positive special testing, right knee MRI ordered to evaluate for meniscus injury  OTC analgesics as needed for symptom management  Avoid deep squatting, cutting, twisting  Follow up after MRI study    Chief Complaint     Right knee pain    History of the Present Illness     Markus Espinal is a 29 y.o. male with Right knee pain ongoing for 1 year. 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 Some cases were falling off a truck, and he tried to stop them from falling off the truck with his leg on 7/6/2023. He felt a pop at that time. His knee got stiff and painful. He saw an orthopedic physician, Dr. Flores in [place], one month later who diagnosed him with meniscus injury. He admits he had an MRI but did not bring the records. He did physical therapy and admits he was getting stronger. He denies mechanical locking. He feels he cannot run or do heavy activities due to pain or instability. Increased pain with twisting. He has not been working since the injury. Pain is managed with motrin.     Review of Systems     Review of Systems   Constitutional:  Negative for chills and fever.   HENT:  Negative for ear pain and sore throat.    Eyes:  Negative for pain and visual disturbance.   Respiratory:  Negative for cough and shortness of breath.    Cardiovascular:  Negative for chest pain and palpitations.  "  Gastrointestinal:  Negative for abdominal pain and vomiting.   Genitourinary:  Negative for dysuria and hematuria.   Musculoskeletal:  Negative for arthralgias and back pain.   Skin:  Negative for color change and rash.   Neurological:  Negative for seizures and syncope.   All other systems reviewed and are negative.      Physical Exam     /76   Pulse 78   Ht 6' 2\" (1.88 m)   Wt 76.4 kg (168 lb 6.4 oz)   BMI 21.62 kg/m²     Right Knee  Range of motion from 0 to 130 with pain.    There is no crepitus with range of motion.   There is no effusion.    There is mild tenderness over the medial joint line.    There is 5/5 quadriceps strength and equal tone.    The patient is able to perform a straight leg raise.      negative patellar grind test.  Anterior drawer tests is negative  negative Lachman Test.   Posterior drawer test is   negative   Varus stress testing reveals no instability at 0 and 30 degrees   Valgus stress testing reveals no instability at 0 and 30 degrees  Sharmila's testing is positive    The patient is neurovascular intact distally.      Eyes:  Anicteric sclerae.  Neck:  Supple.  Lungs:  Normal respiratory effort.  Cardiovascular:  Capillary refill is less than 2 seconds.  Skin:  Intact without erythema.  Neurologic:  Sensation grossly intact to light touch.  Psychiatric:  Mood and affect are appropriate.    Data Review     I have personally reviewed pertinent films in PACS, and my interpretation follows:    X-rays taken 9/20/2024 of Right knee independently reviewed and demonstrate well preserved joint spaces. No acute fracture or dislocation.    Past Medical History:   Diagnosis Date    Aneurysm (HCC) 2010    R sided       Past Surgical History:   Procedure Laterality Date    IR CEREBRAL ANGIOGRAPHY  9/11/2020       No Known Allergies    No current outpatient medications on file prior to visit.     No current facility-administered medications on file prior to visit.       Social History "     Tobacco Use    Smoking status: Former     Types: Cigarettes     Start date: 3/5/2020    Smokeless tobacco: Former   Vaping Use    Vaping status: Never Used   Substance Use Topics    Alcohol use: Yes     Comment: Occasional drinking only    Drug use: Not Currently     Types: Marijuana     Comment: Former smoker       Family History   Problem Relation Age of Onset    Atrial fibrillation Mother              Procedures Performed     Procedures  None.      Neftaly Carnes DO  Scribe Attestation      I,:  Tory Kapoor am acting as a scribe while in the presence of the attending physician.:       I,:  Neftaly Carnes DO personally performed the services described in this documentation    as scribed in my presence.:

## 2024-10-22 ENCOUNTER — HOSPITAL ENCOUNTER (OUTPATIENT)
Dept: MRI IMAGING | Facility: CLINIC | Age: 29
Discharge: HOME | End: 2024-10-22
Payer: OTHER MISCELLANEOUS

## 2024-10-22 DIAGNOSIS — M25.561 CHRONIC PAIN OF RIGHT KNEE: ICD-10-CM

## 2024-10-22 DIAGNOSIS — M23.91 INTERNAL DERANGEMENT OF RIGHT KNEE: ICD-10-CM

## 2024-10-22 DIAGNOSIS — G89.29 CHRONIC PAIN OF RIGHT KNEE: ICD-10-CM

## 2024-10-22 PROCEDURE — 73721 MRI JNT OF LWR EXTRE W/O DYE: CPT

## 2024-10-28 ENCOUNTER — OFFICE VISIT (OUTPATIENT)
Dept: OBGYN CLINIC | Facility: CLINIC | Age: 29
End: 2024-10-28
Payer: COMMERCIAL

## 2024-10-28 VITALS
HEIGHT: 74 IN | DIASTOLIC BLOOD PRESSURE: 57 MMHG | BODY MASS INDEX: 19.58 KG/M2 | OXYGEN SATURATION: 98 % | SYSTOLIC BLOOD PRESSURE: 105 MMHG | WEIGHT: 152.6 LBS | RESPIRATION RATE: 18 BRPM | HEART RATE: 79 BPM

## 2024-10-28 DIAGNOSIS — G89.29 CHRONIC PAIN OF RIGHT KNEE: Primary | ICD-10-CM

## 2024-10-28 DIAGNOSIS — M25.561 CHRONIC PAIN OF RIGHT KNEE: Primary | ICD-10-CM

## 2024-10-28 PROCEDURE — 99213 OFFICE O/P EST LOW 20 MIN: CPT | Performed by: ORTHOPAEDIC SURGERY

## 2024-10-28 NOTE — LETTER
October 30, 2024     Patient: Markus Espinal  YOB: 1995  Date of Visit: 10/28/2024      To Whom it May Concern:    Markus Espinal is under my professional care. Markus was seen in my office on 10/28/2024.  In regards to his right knee, Markus does not have any work restrictions at this time.    If you have any questions or concerns, please don't hesitate to call.         Sincerely,          Neftaly Carnes,         CC: No Recipients

## 2024-10-28 NOTE — PROGRESS NOTES
"Patient Name:  Markus Espinal  MRN:  [id number]    Assessment & Plan     1. Chronic pain of right knee      Right knee chronic pain, with interval improvement since last visit, mild degenerative changes on MRI.  MRI reviewed and discussed with patient  Patient to be full weightbearing to RLE (Right Lower Extremity)  ROM as tolerated to  RLE (Right Lower Extremity)  Discussed with patient treatment options including physical therapy. Patient declined at this time as he reports his knee has been doing well.   Patient to continue at home analgesic regimen with Tylenol   Patient to follow up as needed.          History of the Present Illness   Markus Espinal is a 29 y.o. male with Right knee internal derangement with concern for medial meniscus tear status post MRI study.  The patient reports to the office today to review his most recent MRI study and discussed next steps for his treatment regimen. The patient has discomfort when performing stairs but overall feeling much better than his prior visit.  Denies any locking.      Review of Systems     Review of Systems   Constitutional:  Negative for appetite change and unexpected weight change.   HENT:  Negative for congestion and trouble swallowing.    Eyes:  Negative for visual disturbance.   Respiratory:  Negative for cough and shortness of breath.    Cardiovascular:  Negative for chest pain and palpitations.   Gastrointestinal:  Negative for nausea and vomiting.   Endocrine: Negative for cold intolerance and heat intolerance.   Musculoskeletal:  Positive for arthralgias (right knee). Negative for joint swelling and myalgias.   Skin:  Negative for rash.   Neurological:  Negative for numbness.       Physical Exam     /57 (BP Location: Right arm)   Pulse 79   Resp 18   Ht 6' 2\" (1.88 m)   Wt 69.2 kg (152 lb 9.6 oz)   SpO2 98%   BMI 19.59 kg/m²     Right Knee  Range of motion from 0 to 130.    There is no effusion.    There is no tenderness.    The patient " is able to perform a straight leg raise with 5/5 quad strength.    Varus stress testing reveals no instability at 0 and 30 degrees   Valgus stress testing reveals no instability at 0 and 30 degrees  The patient is neurovascular intact distally.      Data Review     I have personally reviewed pertinent films in PACS, and my interpretation follows.    X-rays taken 9/20/2024 of Right knee independently reviewed and demonstrate well preserved joint spaces. No acute fracture or dislocation.     MRI taken 10/22/24 of Right knee independently reviewed and demonstrate mild tricompartmental thinning of the cartilage.       Social History     Tobacco Use    Smoking status: Former     Types: Cigarettes     Start date: 3/5/2020    Smokeless tobacco: Former   Vaping Use    Vaping status: Never Used   Substance Use Topics    Alcohol use: Yes     Comment: Occasional drinking only    Drug use: Not Currently     Types: Marijuana     Comment: Former smoker           Procedures  None performed.     Karely Wood   Scribe Attestation      I,:  Karely Wood am acting as a scribe while in the presence of the attending physician.:       I,:  Neftaly Carnes, DO personally performed the services described in this documentation    as scribed in my presence.:

## 2024-10-29 ENCOUNTER — TELEPHONE (OUTPATIENT)
Age: 29
End: 2024-10-29

## 2024-10-29 NOTE — TELEPHONE ENCOUNTER
Caller: W/C    Doctor: Deyvi     Reason for call: Asked for office note, all was sent   [phone]

## 2024-10-30 ENCOUNTER — TELEPHONE (OUTPATIENT)
Age: 29
End: 2024-10-30

## 2024-10-30 NOTE — TELEPHONE ENCOUNTER
Caller: Shayy TERAN     Doctor: stacey    Reason for call: Shayy is requesting an updated work status. Please fax to [phone]  Thank you    Call back#: [phone] ext [phone]

## 2024-11-08 NOTE — TELEPHONE ENCOUNTER
Caller: Shayy moe    Doctor: stacey    Reason for call: calling to have work status letter faxed to [phone]    Call back#: [phone] ext [phone]

## 2024-11-12 ENCOUNTER — TELEPHONE (OUTPATIENT)
Age: 29
End: 2024-11-12

## 2024-11-12 NOTE — TELEPHONE ENCOUNTER
Caller: Shayy TERAN    Doctor: Deyvi    Reason for call: Checking when last appointment was and any FU scheduled    Call back#:

## 2024-11-12 NOTE — TELEPHONE ENCOUNTER
Caller: Patient    Doctor: Deyvi    Reason for call: Patient  is requesting an updated work note stating he can return to full duty with no restrictions. Patient wasn't able to obtain a fax number. Please reach out to the patient when the note is available.     Call back#: [phone]

## 2024-11-12 NOTE — TELEPHONE ENCOUNTER
Caller: Patient    Doctor: Deyvi    Reason for call: Patient called stated needs work note to state he can return full duty with no restrictions, will call back with fax number.    Call back#: [phone]

## 2025-01-06 ENCOUNTER — OFFICE VISIT (OUTPATIENT)
Age: 30
End: 2025-01-06
Payer: COMMERCIAL

## 2025-01-06 VITALS
DIASTOLIC BLOOD PRESSURE: 58 MMHG | SYSTOLIC BLOOD PRESSURE: 98 MMHG | RESPIRATION RATE: 18 BRPM | WEIGHT: 174.4 LBS | OXYGEN SATURATION: 98 % | BODY MASS INDEX: 22.38 KG/M2 | HEIGHT: 74 IN | HEART RATE: 82 BPM | TEMPERATURE: 98.5 F

## 2025-01-06 DIAGNOSIS — G89.29 CHRONIC PAIN OF RIGHT KNEE: ICD-10-CM

## 2025-01-06 DIAGNOSIS — M25.561 CHRONIC PAIN OF RIGHT KNEE: ICD-10-CM

## 2025-01-06 DIAGNOSIS — Z11.59 ENCOUNTER FOR HEPATITIS C SCREENING TEST FOR LOW RISK PATIENT: ICD-10-CM

## 2025-01-06 DIAGNOSIS — I67.1 INTRACRANIAL ANEURYSM: ICD-10-CM

## 2025-01-06 DIAGNOSIS — Z13.228 SCREENING FOR METABOLIC DISORDER: ICD-10-CM

## 2025-01-06 DIAGNOSIS — Z00.00 ANNUAL PHYSICAL EXAM: Primary | ICD-10-CM

## 2025-01-06 DIAGNOSIS — Z11.4 SCREENING FOR HIV (HUMAN IMMUNODEFICIENCY VIRUS): ICD-10-CM

## 2025-01-06 PROCEDURE — 99385 PREV VISIT NEW AGE 18-39: CPT | Performed by: STUDENT IN AN ORGANIZED HEALTH CARE EDUCATION/TRAINING PROGRAM

## 2025-01-06 NOTE — PROGRESS NOTES
Adult Annual Physical  Name: Markus Espinal      : 1995      MRN: [id number]  Encounter Provider: Zaki Cadena MD  Encounter Date: 2025   Encounter department: [facility]    Assessment & Plan  Annual physical exam  Immunizations:Influenza, COVID, and Tdap vaccines recommended - patient to consider Tdap vaccine; declines COVID and flu  Labs: CBC, CMP, lipids  Screening: HIV, Hep C        Intracranial aneurysm  Lost to follow up with [facility] due to loss of health insurance. Refer to [facility] to reestablish care.    Orders:    Ambulatory Referral to Neurosurgery; Future    Chronic pain of right knee  Following with Ortho and was previously doing PT. Recent MRI shows degenerative changes. Would like to go to PT - will refer back.  Orders:    Ambulatory Referral to Physical Therapy; Future    Encounter for hepatitis C screening test for low risk patient    Orders:    Hepatitis C antibody; Future    Screening for HIV (human immunodeficiency virus)    Orders:    HIV 1/2 AG/AB w Reflex [facility] for 2 yr old and above; Future    Screening for metabolic disorder    Orders:    CBC and differential; Future    Comprehensive metabolic panel; Future    Lipid panel; Future      Immunizations and preventive care screenings were discussed with patient today. Appropriate education was printed on patient's after visit summary.    Counseling:  Dental Health: discussed importance of regular tooth brushing, flossing, and dental visits.         History of Present Illness     Adult Annual Physical:  Patient presents for annual physical. Markus Espinal is a 28 yo M with PMH of intracranial aneurysm who presents today for annual physical. Has been following with Ortho for right knee pain and going to PT. He had to stop PT but would like to get started again as it was helping him..     Diet and Physical Activity:  - Diet/Nutrition:. Pescatarian diet  - Exercise:. Limited 2/2 chronic right knee  "pain    Depression Screening:  - PHQ-2 Score: 0    General Health:  - Sleep: sleeps well and > 8 hours of sleep on average.  - Hearing:. \"Sensitive hearing\" - musician and   - Vision: most recent eye exam > 1 year ago and no vision problems.  - Dental: no dental visits for > 1 year and brushes teeth twice daily.     Health:  - History of STDs: yes.   - Urinary symptoms: none.     Advanced Care Planning:  - Has an advanced directive?: no    - Has a durable medical POA?: no    - ACP document given to patient?: no      Review of Systems   Constitutional:  Negative for appetite change, chills and fever.   HENT:  Negative for congestion and sore throat.    Eyes:  Negative for visual disturbance.   Respiratory:  Negative for cough and shortness of breath.    Cardiovascular:  Negative for chest pain and leg swelling.   Gastrointestinal:  Negative for abdominal pain, constipation, diarrhea and nausea.   Genitourinary:  Negative for difficulty urinating and dysuria.   Musculoskeletal:  Negative for arthralgias and myalgias.        Right knee pain   Skin:  Negative for rash.   Neurological:  Negative for dizziness, light-headedness and headaches.   Psychiatric/Behavioral:  Negative for confusion.      Medical History Reviewed by provider this encounter:  Tobacco  Allergies  Meds  Problems  Med Hx  Surg Hx  Fam Hx     .  Past Medical History   Past Medical History:   Diagnosis Date    Aneurysm (HCC) 2010    R sided     Past Surgical History:   Procedure Laterality Date    IR CEREBRAL ANGIOGRAPHY  9/11/2020     Family History   Problem Relation Age of Onset    Atrial fibrillation Mother       reports that he has quit smoking. His smoking use included cigarettes. He started smoking about 4 years ago. He has been exposed to tobacco smoke. He has quit using smokeless tobacco. He reports current alcohol use. He reports that he does not currently use drugs after having used the following drugs: Marijuana.  No " "current outpatient medications on file prior to visit.     No current facility-administered medications on file prior to visit.   No Known Allergies   No current outpatient medications on file prior to visit.     No current facility-administered medications on file prior to visit.      Social History     Tobacco Use    Smoking status: Former     Types: Cigarettes     Start date: 3/5/2020     Passive exposure: Past    Smokeless tobacco: Former   Vaping Use    Vaping status: Never Used   Substance and Sexual Activity    Alcohol use: Yes     Comment: Occasional drinking only    Drug use: Not Currently     Types: Marijuana     Comment: Former smoker    Sexual activity: Yes     Partners: Female       Objective   BP 98/58 (BP Location: Left arm, Patient Position: Sitting, Cuff Size: Standard)   Pulse 82   Temp 98.5 °F (36.9 °C) (Tympanic)   Resp 18   Ht 6' 2\" (1.88 m)   Wt 79.1 kg (174 lb 6.4 oz)   SpO2 98%   BMI 22.39 kg/m²     Physical Exam  Constitutional:       General: He is not in acute distress.  HENT:      Right Ear: Tympanic membrane, ear canal and external ear normal.      Left Ear: Tympanic membrane, ear canal and external ear normal.      Nose: Nose normal.      Mouth/Throat:      Mouth: Mucous membranes are moist.      Pharynx: Oropharynx is clear.   Eyes:      Conjunctiva/sclera: Conjunctivae normal.      Pupils: Pupils are equal, round, and reactive to light.   Neck:      Thyroid: No thyroid mass or thyroid tenderness.   Cardiovascular:      Rate and Rhythm: Normal rate and regular rhythm.      Heart sounds: Normal heart sounds.   Pulmonary:      Effort: Pulmonary effort is normal.      Breath sounds: Normal breath sounds.   Abdominal:      General: There is no distension.      Tenderness: There is no abdominal tenderness.   Musculoskeletal:      Cervical back: Neck supple.      Right lower leg: No edema.      Left lower leg: No edema.   Skin:     General: Skin is warm and dry.   Neurological:      " Mental Status: He is alert.      Sensory: Sensation is intact.      Motor: Motor function is intact.   Psychiatric:         Mood and Affect: Mood normal.         Speech: Speech normal.         Behavior: Behavior normal. Behavior is cooperative.

## 2025-01-06 NOTE — ASSESSMENT & PLAN NOTE
Lost to follow up with [facility] due to loss of health insurance. Refer to [facility] to reestablish care.    Orders:    Ambulatory Referral to Neurosurgery; Future

## 2025-01-06 NOTE — PATIENT INSTRUCTIONS
"Patient Education     Routine physical for adults   The Basics   Written by the doctors and editors at [facility]   What is a physical? -- A physical is a routine visit, or \"check-up,\" with your doctor. You might also hear it called a \"wellness visit\" or \"preventive visit.\"  During each visit, the doctor will:   Ask about your physical and mental health   Ask about your habits, behaviors, and lifestyle   Do an exam   Give you vaccines if needed   Talk to you about any medicines you take   Give advice about your health   Answer your questions  Getting regular check-ups is an important part of taking care of your health. It can help your doctor find and treat any problems you have. But it's also important for preventing health problems.  A routine physical is different from a \"sick visit.\" A sick visit is when you see a doctor because of a health concern or problem. Since physicals are scheduled ahead of time, you can think about what you want to ask the doctor.  How often should I get a physical? -- It depends on your age and health. In general, for people age 21 years and older:   If you are younger than 50 years, you might be able to get a physical every 3 years.   If you are 50 years or older, your doctor might recommend a physical every year.  If you have an ongoing health condition, like diabetes or high blood pressure, your doctor will probably want to see you more often.  What happens during a physical? -- In general, each visit will include:   Physical exam - The doctor or nurse will check your height, weight, heart rate, and blood pressure. They will also look at your eyes and ears. They will ask about how you are feeling and whether you have any symptoms that bother you.   Medicines - It's a good idea to bring a list of all the medicines you take to each doctor visit. Your doctor will talk to you about your medicines and answer any questions. Tell them if you are having any side effects that bother you. You " "should also tell them if you are having trouble paying for any of your medicines.   Habits and behaviors - This includes:   Your diet   Your exercise habits   Whether you smoke, drink alcohol, or use drugs   Whether you are sexually active   Whether you feel safe at home  Your doctor will talk to you about things you can do to improve your health and lower your risk of health problems. They will also offer help and support. For example, if you want to quit smoking, they can give you advice and might prescribe medicines. If you want to improve your diet or get more physical activity, they can help you with this, too.   Lab tests, if needed - The tests you get will depend on your age and situation. For example, your doctor might want to check your:   Cholesterol   Blood sugar   Iron level   Vaccines - The recommended vaccines will depend on your age, health, and what vaccines you already had. Vaccines are very important because they can prevent certain serious or deadly infections.   Discussion of screening - \"Screening\" means checking for diseases or other health problems before they cause symptoms. Your doctor can recommend screening based on your age, risk, and preferences. This might include tests to check for:   Cancer, such as breast, prostate, cervical, ovarian, colorectal, prostate, lung, or skin cancer   Sexually transmitted infections, such as chlamydia and gonorrhea   Mental health conditions like depression and anxiety  Your doctor will talk to you about the different types of screening tests. They can help you decide which screenings to have. They can also explain what the results might mean.   Answering questions - The physical is a good time to ask the doctor or nurse questions about your health. If needed, they can refer you to other doctors or specialists, too.  Adults older than 65 years often need other care, too. As you get older, your doctor will talk to you about:   How to prevent falling at " home   Hearing or vision tests   Memory testing   How to take your medicines safely   Making sure that you have the help and support you need at home  All topics are updated as new evidence becomes available and our peer review process is complete.  This topic retrieved from [employer] on: May 02, 2024.  Topic 069907 Version 1.0  Release: 32.4.3 - C32.122  © 2024 UpToDate, Inc. and/or its affiliates. All rights reserved.  Consumer Information Use and Disclaimer   Disclaimer: This generalized information is a limited summary of diagnosis, treatment, and/or medication information. It is not meant to be comprehensive and should be used as a tool to help the user understand and/or assess potential diagnostic and treatment options. It does NOT include all information about conditions, treatments, medications, side effects, or risks that may apply to a specific patient. It is not intended to be medical advice or a substitute for the medical advice, diagnosis, or treatment of a health care provider based on the health care provider's examination and assessment of a patient's specific and unique circumstances. Patients must speak with a health care provider for complete information about their health, medical questions, and treatment options, including any risks or benefits regarding use of medications. This information does not endorse any treatments or medications as safe, effective, or approved for treating a specific patient. UpToDate, Inc. and its affiliates disclaim any warranty or liability relating to this information or the use thereof.The use of this information is governed by the Terms of Use, available at https://www.woltersCiashopuwer.com/en/know/clinical-effectiveness-terms. 2024© UpToDate, Inc. and its affiliates and/or licensors. All rights reserved.  Copyright   © 2024 UpToDate, Inc. and/or its affiliates. All rights reserved.

## 2025-01-17 ENCOUNTER — TELEPHONE (OUTPATIENT)
Dept: NEUROSURGERY | Facility: CLINIC | Age: 30
End: 2025-01-17

## 2025-01-17 NOTE — TELEPHONE ENCOUNTER
1/17/25    NO SHOW APPT FOR TODAY    NEWPT HX OF ANEURYSM CTA SL, PER DR CAO PT NEEDS MRA- SOLO W/ AP

## 2025-01-20 NOTE — TELEPHONE ENCOUNTER
1/21/25     TRIED TO CALL PT TO RESCHEDULE NO SHOW APPT FROM [facility], 1/17. NO ANSWER, LVM.          1/20/25    TRIED TO CALL PT TO RESCHEDULE NO SHOW APPT FROM [facility], 1/17. NO ANSWER, LVM.

## 2025-01-28 ENCOUNTER — TELEPHONE (OUTPATIENT)
Dept: NEUROSURGERY | Facility: CLINIC | Age: 30
End: 2025-01-28

## 2025-01-28 NOTE — LETTER
[facility]  [address]  [facility] [address]  Phone#  [phone]  Fax#  [phone]        January 28, 2025        Dear:   Markus Espinal         Our office has attempted to contact you several times regarding your missed appointment on 1/17/25.  .  Could you please contact our office at [phone] to rescheduled.    Thank you.     Sincerely,    [facility]

## 2025-03-07 ENCOUNTER — TELEPHONE (OUTPATIENT)
Age: 30
End: 2025-03-07

## 2025-03-07 DIAGNOSIS — I67.1 INTRACRANIAL ANEURYSM: Primary | ICD-10-CM

## 2025-03-07 NOTE — TELEPHONE ENCOUNTER
Pts mom would like the PCP to send over referral for Psychiatry in the [facility]. PCP please call the mo back to further advise. Mom is requesting referral for neurovascular for Dr. Hardy at [address] [place] [address] in [place]. Please call mother back once this is placed @[phone].  Thank you

## 2025-03-14 ENCOUNTER — TELEPHONE (OUTPATIENT)
Age: 30
End: 2025-03-14

## 2025-03-14 DIAGNOSIS — F99 PSYCHIATRIC COMPLAINT: Primary | ICD-10-CM

## 2025-03-14 NOTE — TELEPHONE ENCOUNTER
Pts mom called back to inform office mother would like to go with ([facility]). Mom states their contact# is [phone]. Mom states the facility needs information and approval from PCP before pt can set up an appt.

## 2025-03-14 NOTE — TELEPHONE ENCOUNTER
Mom called back to get an update, she did not receive calls or vm. Mom wants pt to get assessed and possibly dx: for ADHD/OCD. Mom thought she has to go this route, correct? If not, please further advise next best steps to get pt fully assessed, in this area.    Also, mom states she didn't have the pt go to that vascular neurologist, because he is too far. The only person that's drives, is dad. Mom wants pt to be seen by Dr. Hardy because he is close, to pts location. Triage nurse gave mom the contact# to schedule pt.    Please call mom back directly @[phone].

## 2025-03-14 NOTE — TELEPHONE ENCOUNTER
Mom calling in on behalf of patient. Medical communication consent on file. Patient is seeking services for time management, OCD, feeling stuck, health challenges, germs. Routine referral on file from PCP for psychological testing.     Patient [place] location closest to [place] as patient or mom do not drive.     Placing patient on both TT and [facility] and emailing psychological testing resources to [email or web address]

## 2025-04-07 ENCOUNTER — TELEPHONE (OUTPATIENT)
Age: 30
End: 2025-04-07

## 2025-04-07 NOTE — TELEPHONE ENCOUNTER
Mother called in to see how the process would go for the pt to get testing done as well as being seen by a provider. Writer explained

## 2025-04-07 NOTE — TELEPHONE ENCOUNTER
Patient has been added to the Medication Management and Talk Therapy wait list with a referral.    Insurance:     [employer]     Insurance Type:    Commercial []   Medicaid []   [facility]   Medicare []  Location Preference: n/a  Provider Preference: n/a   Virtual: Yes [] No [x]  Were outside resources sent: Yes [] No [x]

## 2025-04-15 NOTE — TELEPHONE ENCOUNTER
Patients mother called whom is on the [facility] stating she need the referral to be sent to psychology testing in [place]. Writer informed patient to call PCP to request the referral to be sent to them. Pt expressed her understanding.

## 2025-04-25 ENCOUNTER — TELEPHONE (OUTPATIENT)
Dept: NEUROLOGY | Facility: CLINIC | Age: 30
End: 2025-04-25

## 2025-04-30 ENCOUNTER — TELEPHONE (OUTPATIENT)
Dept: NEUROLOGY | Facility: CLINIC | Age: 30
End: 2025-04-30

## 2025-04-30 NOTE — TELEPHONE ENCOUNTER
LVM confirming appointment for 5/1/25 at 4 PM with Dr. Hardy. Gave [phone] to reschedule if needed.